# Patient Record
Sex: FEMALE | Race: WHITE | Employment: FULL TIME | URBAN - METROPOLITAN AREA
[De-identification: names, ages, dates, MRNs, and addresses within clinical notes are randomized per-mention and may not be internally consistent; named-entity substitution may affect disease eponyms.]

---

## 2017-02-26 DIAGNOSIS — Z01.419 WELL WOMAN EXAM: Primary | ICD-10-CM

## 2017-05-26 RX ORDER — HYDROCORTISONE ACETATE, ALOE VERA LEAF AND IODOQUINOL 20; 10; 10 MG/G; MG/G; MG/G
1 GEL TOPICAL 3 TIMES DAILY
Qty: 48 G | Refills: 3 | Status: SHIPPED | OUTPATIENT
Start: 2017-05-26 | End: 2018-01-22

## 2017-08-01 ENCOUNTER — LAB ENCOUNTER (OUTPATIENT)
Dept: LAB | Age: 37
End: 2017-08-01
Attending: OBSTETRICS & GYNECOLOGY
Payer: COMMERCIAL

## 2017-08-01 DIAGNOSIS — Z01.419 WELL WOMAN EXAM: Primary | ICD-10-CM

## 2017-08-01 DIAGNOSIS — Z01.419 WELL WOMAN EXAM: ICD-10-CM

## 2017-08-01 LAB
25-HYDROXYVITAMIN D (TOTAL): 27.8 NG/ML (ref 30–100)
ALBUMIN SERPL-MCNC: 4.1 G/DL (ref 3.5–4.8)
ALP LIVER SERPL-CCNC: 32 U/L (ref 37–98)
ALT SERPL-CCNC: 19 U/L (ref 14–54)
AST SERPL-CCNC: 12 U/L (ref 15–41)
BASOPHILS # BLD AUTO: 0.02 X10(3) UL (ref 0–0.1)
BASOPHILS NFR BLD AUTO: 0.4 %
BILIRUB SERPL-MCNC: 0.5 MG/DL (ref 0.1–2)
BUN BLD-MCNC: 15 MG/DL (ref 8–20)
CALCIUM BLD-MCNC: 8.8 MG/DL (ref 8.3–10.3)
CHLORIDE: 107 MMOL/L (ref 101–111)
CHOLEST SMN-MCNC: 170 MG/DL (ref ?–200)
CO2: 27 MMOL/L (ref 22–32)
CREAT BLD-MCNC: 0.76 MG/DL (ref 0.55–1.02)
EOSINOPHIL # BLD AUTO: 0.02 X10(3) UL (ref 0–0.3)
EOSINOPHIL NFR BLD AUTO: 0.4 %
ERYTHROCYTE [DISTWIDTH] IN BLOOD BY AUTOMATED COUNT: 13.1 % (ref 11.5–16)
GLUCOSE BLD-MCNC: 99 MG/DL (ref 70–99)
HCT VFR BLD AUTO: 39.9 % (ref 34–50)
HDLC SERPL-MCNC: 64 MG/DL (ref 45–?)
HDLC SERPL: 2.66 {RATIO} (ref ?–4.44)
HGB BLD-MCNC: 13 G/DL (ref 12–16)
IMMATURE GRANULOCYTE COUNT: 0.02 X10(3) UL (ref 0–1)
IMMATURE GRANULOCYTE RATIO %: 0.4 %
LDLC SERPL CALC-MCNC: 93 MG/DL (ref ?–130)
LDLC SERPL-MCNC: 13 MG/DL (ref 5–40)
LYMPHOCYTES # BLD AUTO: 1.58 X10(3) UL (ref 0.9–4)
LYMPHOCYTES NFR BLD AUTO: 32.1 %
M PROTEIN MFR SERPL ELPH: 7.6 G/DL (ref 6.1–8.3)
MCH RBC QN AUTO: 30.4 PG (ref 27–33.2)
MCHC RBC AUTO-ENTMCNC: 32.6 G/DL (ref 31–37)
MCV RBC AUTO: 93.4 FL (ref 81–100)
MONOCYTES # BLD AUTO: 0.31 X10(3) UL (ref 0.1–0.6)
MONOCYTES NFR BLD AUTO: 6.3 %
NEUTROPHIL ABS PRELIM: 2.97 X10 (3) UL (ref 1.3–6.7)
NEUTROPHILS # BLD AUTO: 2.97 X10(3) UL (ref 1.3–6.7)
NEUTROPHILS NFR BLD AUTO: 60.4 %
NONHDLC SERPL-MCNC: 106 MG/DL (ref ?–130)
PLATELET # BLD AUTO: 278 10(3)UL (ref 150–450)
POTASSIUM SERPL-SCNC: 4.1 MMOL/L (ref 3.6–5.1)
RBC # BLD AUTO: 4.27 X10(6)UL (ref 3.8–5.1)
RED CELL DISTRIBUTION WIDTH-SD: 44.7 FL (ref 35.1–46.3)
SODIUM SERPL-SCNC: 139 MMOL/L (ref 136–144)
TRIGLYCERIDES: 67 MG/DL (ref ?–150)
TSI SER-ACNC: 1.4 MIU/ML (ref 0.35–5.5)
WBC # BLD AUTO: 4.9 X10(3) UL (ref 4–13)

## 2017-08-01 PROCEDURE — 80061 LIPID PANEL: CPT | Performed by: OBSTETRICS & GYNECOLOGY

## 2017-08-01 PROCEDURE — 80050 GENERAL HEALTH PANEL: CPT | Performed by: OBSTETRICS & GYNECOLOGY

## 2017-08-01 PROCEDURE — 82306 VITAMIN D 25 HYDROXY: CPT | Performed by: OBSTETRICS & GYNECOLOGY

## 2017-08-03 RX ORDER — ERGOCALCIFEROL 1.25 MG/1
50000 CAPSULE ORAL
Qty: 6 CAPSULE | Refills: 1 | Status: SHIPPED | OUTPATIENT
Start: 2017-08-03 | End: 2018-01-22

## 2017-10-06 ENCOUNTER — OFFICE VISIT (OUTPATIENT)
Dept: OBGYN CLINIC | Facility: CLINIC | Age: 37
End: 2017-10-06

## 2017-10-06 VITALS
WEIGHT: 130 LBS | HEIGHT: 68 IN | SYSTOLIC BLOOD PRESSURE: 100 MMHG | DIASTOLIC BLOOD PRESSURE: 58 MMHG | BODY MASS INDEX: 19.7 KG/M2

## 2017-10-06 DIAGNOSIS — N89.8 VAGINA ITCHING: ICD-10-CM

## 2017-10-06 DIAGNOSIS — Z01.419 ENCOUNTER FOR WELL WOMAN EXAM WITH ROUTINE GYNECOLOGICAL EXAM: Primary | ICD-10-CM

## 2017-10-06 PROCEDURE — 99395 PREV VISIT EST AGE 18-39: CPT | Performed by: OBSTETRICS & GYNECOLOGY

## 2017-10-06 PROCEDURE — 87480 CANDIDA DNA DIR PROBE: CPT | Performed by: OBSTETRICS & GYNECOLOGY

## 2017-10-06 PROCEDURE — 87510 GARDNER VAG DNA DIR PROBE: CPT | Performed by: OBSTETRICS & GYNECOLOGY

## 2017-10-06 PROCEDURE — 88175 CYTOPATH C/V AUTO FLUID REDO: CPT | Performed by: OBSTETRICS & GYNECOLOGY

## 2017-10-06 PROCEDURE — 87660 TRICHOMONAS VAGIN DIR PROBE: CPT | Performed by: OBSTETRICS & GYNECOLOGY

## 2017-10-06 NOTE — PROGRESS NOTES
Che Dubose is a 42year old female [id number] Patient's last menstrual period was 2017 (exact date).  Patient presents with:  Physical  .no complaints    OBSTETRICS HISTORY:  OB History    Para Term  AB Living   1 1 1     1   SAB TAB E % External Ointment, Apply 1 g topically 2 (two) times daily. , Disp: 15 g, Rfl: 3  •  cephALEXin 500 MG Oral Cap, Take 1 capsule (500 mg total) by mouth 3 (three) times daily. , Disp: 90 capsule, Rfl: 1  •  Iodoquinol-HC-Aloe Polysacch (ALCORTIN A) 1-2-1 % lesions and prolapse  Bladder:  No fullness, masses or tenderness  Vagina:  Normal appearance without lesions, no abnormal discharge  Cervix:  Normal without tenderness on motion  Uterus: normal in size, contour, position, mobility, without tenderness  Adn

## 2017-10-10 ENCOUNTER — MED REC SCAN ONLY (OUTPATIENT)
Dept: OBGYN CLINIC | Facility: CLINIC | Age: 37
End: 2017-10-10

## 2017-12-04 RX ORDER — PRENATAL 56/IRON/FOLIC AC/DHA 35-5-1 MG
1 CAPSULE ORAL DAILY
Qty: 30 CAPSULE | Refills: 6 | Status: SHIPPED | OUTPATIENT
Start: 2017-12-04 | End: 2018-01-03

## 2018-01-22 ENCOUNTER — OFFICE VISIT (OUTPATIENT)
Dept: FAMILY MEDICINE CLINIC | Facility: CLINIC | Age: 38
End: 2018-01-22

## 2018-01-22 VITALS
RESPIRATION RATE: 20 BRPM | HEIGHT: 68 IN | WEIGHT: 133 LBS | DIASTOLIC BLOOD PRESSURE: 68 MMHG | TEMPERATURE: 99 F | OXYGEN SATURATION: 98 % | BODY MASS INDEX: 20.16 KG/M2 | SYSTOLIC BLOOD PRESSURE: 120 MMHG | HEART RATE: 70 BPM

## 2018-01-22 DIAGNOSIS — K64.8 INTERNAL HEMORRHOID: ICD-10-CM

## 2018-01-22 DIAGNOSIS — K59.09 OTHER CONSTIPATION: Primary | ICD-10-CM

## 2018-01-22 PROCEDURE — 99203 OFFICE O/P NEW LOW 30 MIN: CPT | Performed by: FAMILY MEDICINE

## 2018-01-23 NOTE — PROGRESS NOTES
HPI:   Lon Boyce is a 42year old female here with rectal pain and constipation. Pt has been constipated since her 17's. Pt c/o rectal pain   Pt is very busy at work and no time to [employer] on the toilet. \"    Pt stressed with work and her 's /68   Pulse 70   Temp 98.7 °F (37.1 °C) (Oral)   Resp 20   Ht 68\"   Wt 133 lb   LMP 01/15/2018   SpO2 98%   BMI 20.22 kg/m²   Body mass index is 20.22 kg/m².    GENERAL: alert and oriented X 3, well developed, well nourished,in no apparent distress

## 2018-02-01 ENCOUNTER — TELEPHONE (OUTPATIENT)
Dept: FAMILY MEDICINE CLINIC | Facility: CLINIC | Age: 38
End: 2018-02-01

## 2018-02-01 NOTE — TELEPHONE ENCOUNTER
Patient called, she was prescribed Anusol. Not covered under insurance, would like to know if there is another medication that can be prescribed?     Please call

## 2018-04-17 DIAGNOSIS — Z12.31 VISIT FOR SCREENING MAMMOGRAM: Primary | ICD-10-CM

## 2018-04-25 ENCOUNTER — HOSPITAL ENCOUNTER (OUTPATIENT)
Dept: MAMMOGRAPHY | Age: 38
Discharge: HOME | End: 2018-04-25
Attending: ADVANCED PRACTICE MIDWIFE
Payer: COMMERCIAL

## 2018-04-25 DIAGNOSIS — Z12.31 VISIT FOR SCREENING MAMMOGRAM: ICD-10-CM

## 2018-04-25 PROCEDURE — 77067 SCR MAMMO BI INCL CAD: CPT | Performed by: OBSTETRICS & GYNECOLOGY

## 2018-10-12 ENCOUNTER — OFFICE VISIT (OUTPATIENT)
Dept: OBGYN CLINIC | Facility: CLINIC | Age: 38
End: 2018-10-12
Payer: COMMERCIAL

## 2018-10-12 VITALS
RESPIRATION RATE: 16 BRPM | HEIGHT: 68 IN | WEIGHT: 135 LBS | DIASTOLIC BLOOD PRESSURE: 62 MMHG | HEART RATE: 80 BPM | SYSTOLIC BLOOD PRESSURE: 110 MMHG | BODY MASS INDEX: 20.46 KG/M2

## 2018-10-12 DIAGNOSIS — Z12.4 CERVICAL CANCER SCREENING: ICD-10-CM

## 2018-10-12 DIAGNOSIS — Z12.39 BREAST CANCER SCREENING: ICD-10-CM

## 2018-10-12 DIAGNOSIS — Z01.419 ENCOUNTER FOR WELL WOMAN EXAM WITH ROUTINE GYNECOLOGICAL EXAM: Primary | ICD-10-CM

## 2018-10-12 DIAGNOSIS — N89.8 VAGINAL DISCHARGE: ICD-10-CM

## 2018-10-12 PROCEDURE — 87624 HPV HI-RISK TYP POOLED RSLT: CPT | Performed by: OBSTETRICS & GYNECOLOGY

## 2018-10-12 PROCEDURE — 87480 CANDIDA DNA DIR PROBE: CPT | Performed by: OBSTETRICS & GYNECOLOGY

## 2018-10-12 PROCEDURE — 88175 CYTOPATH C/V AUTO FLUID REDO: CPT | Performed by: OBSTETRICS & GYNECOLOGY

## 2018-10-12 PROCEDURE — 87660 TRICHOMONAS VAGIN DIR PROBE: CPT | Performed by: OBSTETRICS & GYNECOLOGY

## 2018-10-12 PROCEDURE — 87510 GARDNER VAG DNA DIR PROBE: CPT | Performed by: OBSTETRICS & GYNECOLOGY

## 2018-10-12 PROCEDURE — 99396 PREV VISIT EST AGE 40-64: CPT | Performed by: OBSTETRICS & GYNECOLOGY

## 2018-10-12 NOTE — PROGRESS NOTES
Nallely Tolentino is a 34year old female [id number] Patient's last menstrual period was 09/17/2018 (exact date). No chief complaint on file.   .  C/o rosacea getting worse, saw dermatologist   C/o increased vaginal discharge    OBSTETRICS HISTORY:  OB History tobacco: Never Used    Substance and Sexual Activity      Alcohol use: No      Drug use: No      Sexual activity: Not on file    Other Topics      Concerns:        Not on file    Social History Narrative      Not on file      FAMILY HISTORY:  No family his masses or tenderness  Vagina:  Normal appearance without lesions, no abnormal discharge  Cervix:  Normal without tenderness on motion  Uterus: normal in size, contour, position, mobility, without tenderness  Adnexa: normal without masses or tenderness  Per

## 2018-10-23 ENCOUNTER — OFFICE VISIT (OUTPATIENT)
Dept: FAMILY MEDICINE CLINIC | Facility: CLINIC | Age: 38
End: 2018-10-23
Payer: COMMERCIAL

## 2018-10-23 VITALS
TEMPERATURE: 98 F | BODY MASS INDEX: 20.16 KG/M2 | OXYGEN SATURATION: 98 % | HEART RATE: 86 BPM | RESPIRATION RATE: 16 BRPM | DIASTOLIC BLOOD PRESSURE: 74 MMHG | SYSTOLIC BLOOD PRESSURE: 110 MMHG | WEIGHT: 133 LBS | HEIGHT: 68 IN

## 2018-10-23 DIAGNOSIS — L71.9 ROSACEA: Primary | ICD-10-CM

## 2018-10-23 DIAGNOSIS — K62.89 RECTAL PAIN: ICD-10-CM

## 2018-10-23 DIAGNOSIS — K59.09 CHRONIC CONSTIPATION: ICD-10-CM

## 2018-10-23 PROCEDURE — 99214 OFFICE O/P EST MOD 30 MIN: CPT | Performed by: FAMILY MEDICINE

## 2018-10-23 RX ORDER — AZELAIC ACID 0.15 G/G
1 GEL TOPICAL 2 TIMES DAILY
Qty: 50 G | Refills: 1 | Status: SHIPPED | OUTPATIENT
Start: 2018-10-23 | End: 2018-10-23

## 2018-10-23 RX ORDER — AZELAIC ACID 0.15 G/G
1 GEL TOPICAL 2 TIMES DAILY
Qty: 50 G | Refills: 1 | Status: SHIPPED | OUTPATIENT
Start: 2018-10-23 | End: 2019-05-08

## 2018-11-06 DIAGNOSIS — Z01.419 ENCOUNTER FOR WELL WOMAN EXAM: Primary | ICD-10-CM

## 2018-11-08 ENCOUNTER — LAB ENCOUNTER (OUTPATIENT)
Dept: LAB | Age: 38
End: 2018-11-08
Attending: OBSTETRICS & GYNECOLOGY
Payer: COMMERCIAL

## 2018-11-08 DIAGNOSIS — Z01.419 ENCOUNTER FOR WELL WOMAN EXAM: ICD-10-CM

## 2018-11-08 PROCEDURE — 80053 COMPREHEN METABOLIC PANEL: CPT

## 2018-11-08 PROCEDURE — 82306 VITAMIN D 25 HYDROXY: CPT

## 2018-11-08 PROCEDURE — 80061 LIPID PANEL: CPT

## 2018-11-08 PROCEDURE — 84443 ASSAY THYROID STIM HORMONE: CPT

## 2018-11-08 PROCEDURE — 85025 COMPLETE CBC W/AUTO DIFF WBC: CPT

## 2018-11-09 RX ORDER — ERGOCALCIFEROL (VITAMIN D2) 1250 MCG
50000 CAPSULE ORAL WEEKLY
Qty: 5 CAPSULE | Refills: 0 | Status: SHIPPED | OUTPATIENT
Start: 2018-11-09 | End: 2018-12-08

## 2018-12-12 ENCOUNTER — TELEPHONE (OUTPATIENT)
Dept: FAMILY MEDICINE CLINIC | Facility: CLINIC | Age: 38
End: 2018-12-12

## 2018-12-12 DIAGNOSIS — K62.89 RECTAL PAIN: ICD-10-CM

## 2018-12-12 NOTE — TELEPHONE ENCOUNTER
Patient is looking for refill of medication for her Hemorrhoids    Proctozone        Ok to LVM patient is at work. Send to [facility] on file.

## 2018-12-18 ENCOUNTER — TELEPHONE (OUTPATIENT)
Dept: FAMILY MEDICINE CLINIC | Facility: CLINIC | Age: 38
End: 2018-12-18

## 2018-12-18 DIAGNOSIS — K62.89 RECTAL PAIN: ICD-10-CM

## 2018-12-18 NOTE — TELEPHONE ENCOUNTER
Pt is calling to say that she is asking for Proctozone. When she went to the pharmacy they wanted to give her Hydrocortisone. Is that the generic of the Proctozone? [facility]  On file. Pls call her back to advise.

## 2018-12-18 NOTE — TELEPHONE ENCOUNTER
Rx Request  hydrocortisone 2.5 % Rectal Cream    Disp:         28.35 g           R: 0      Associated Dx: Rectal pain    Last Visit: 10/23/2018    Last Refilled: 12/12/2018 (External Ointment)    Patient requesting \"Rectal Cream\" instead of external oint

## 2019-01-08 RX ORDER — VALACYCLOVIR HYDROCHLORIDE 1 G/1
1 TABLET, FILM COATED ORAL DAILY
Qty: 30 TABLET | Refills: 3 | Status: SHIPPED | OUTPATIENT
Start: 2019-01-08 | End: 2019-02-07

## 2019-04-17 ENCOUNTER — OFFICE VISIT (OUTPATIENT)
Dept: SURGERY | Facility: CLINIC | Age: 39
End: 2019-04-17

## 2019-04-17 VITALS — HEIGHT: 68 IN | WEIGHT: 130 LBS | BODY MASS INDEX: 19.7 KG/M2

## 2019-04-17 DIAGNOSIS — Z53.21 PROC/TRTMT NOT CRD OUT D/T PT LV BEF SEEN BY HLTH CARE PROV: Primary | ICD-10-CM

## 2019-04-17 NOTE — H&P
New Patient Visit Note       Active Problems      1. Proc/trtmt not crd out d/t pt lv bef seen by [facility] care prov        Chief Complaint   Patient presents with:  Anal / Rectal Problem: NEW PT - HEMORRHOIDS. PT C/O ITCHING AND ON/OFF BLEEDING AND ANURADHA.  PT S 56.7 g, Rfl: 0  •  Tobramycin Sulfate 0.3 % Ophthalmic Solution, Apply 1 or 2 GTT to the right index fingernail twice daily, Disp: 1 Bottle, Rfl: 1  •  hydrocortisone 2.5 % Rectal Cream, Place 1 Application rectally 2 (two) times daily. , Disp: 28.35 g, Rfl

## 2019-05-08 ENCOUNTER — OFFICE VISIT (OUTPATIENT)
Dept: OBGYN CLINIC | Facility: CLINIC | Age: 39
End: 2019-05-08
Payer: COMMERCIAL

## 2019-05-08 VITALS
BODY MASS INDEX: 19.25 KG/M2 | HEART RATE: 62 BPM | WEIGHT: 127 LBS | RESPIRATION RATE: 12 BRPM | HEIGHT: 68 IN | DIASTOLIC BLOOD PRESSURE: 72 MMHG | SYSTOLIC BLOOD PRESSURE: 106 MMHG

## 2019-05-08 DIAGNOSIS — N89.8 VAGINAL DISCHARGE: ICD-10-CM

## 2019-05-08 DIAGNOSIS — L29.2 VULVAR ITCHING: Primary | ICD-10-CM

## 2019-05-08 PROCEDURE — 87480 CANDIDA DNA DIR PROBE: CPT | Performed by: NURSE PRACTITIONER

## 2019-05-08 PROCEDURE — 87510 GARDNER VAG DNA DIR PROBE: CPT | Performed by: NURSE PRACTITIONER

## 2019-05-08 PROCEDURE — 87660 TRICHOMONAS VAGIN DIR PROBE: CPT | Performed by: NURSE PRACTITIONER

## 2019-05-08 PROCEDURE — 99213 OFFICE O/P EST LOW 20 MIN: CPT | Performed by: NURSE PRACTITIONER

## 2019-05-08 NOTE — PROGRESS NOTES
HPI:   Raj Daniels is a 34year old female who presents evaluation and management of vulvar itching and discharge on and off for the past few months. Menses: Patient's last menstrual period was 04/30/2019 (exact date).    Onset: see HPI  Pain: no  Evy Moon acne, no hirsutism, no ulcers  BREASTS: Normal inspection, no dominant masses on palpation, no lymphadenopathy  ABD: Soft, nontender and not distended, no masses, no hernia  EXTREMITIES: No edema  EXTERNAL GENITALIA: Normal appearance for age, no lesions,

## 2019-07-02 RX ORDER — FLUCONAZOLE 150 MG/1
150 TABLET ORAL SEE ADMIN INSTRUCTIONS
Qty: 2 TABLET | Refills: 0 | Status: SHIPPED | OUTPATIENT
Start: 2019-07-02 | End: 2019-09-06 | Stop reason: ALTCHOICE

## 2019-09-06 ENCOUNTER — HOSPITAL ENCOUNTER (OUTPATIENT)
Age: 39
Discharge: HOME | End: 2019-09-06
Attending: EMERGENCY MEDICINE
Payer: COMMERCIAL

## 2019-09-06 VITALS
HEART RATE: 73 BPM | RESPIRATION RATE: 16 BRPM | SYSTOLIC BLOOD PRESSURE: 120 MMHG | DIASTOLIC BLOOD PRESSURE: 86 MMHG | TEMPERATURE: 99 F | OXYGEN SATURATION: 100 %

## 2019-09-06 DIAGNOSIS — K29.00 ACUTE GASTRITIS WITHOUT HEMORRHAGE, UNSPECIFIED GASTRITIS TYPE: Primary | ICD-10-CM

## 2019-09-06 LAB
#MXD IC: 0.7 X10ˆ3/UL (ref 0.1–1)
ALBUMIN SERPL-MCNC: 3.5 G/DL (ref 3.4–5)
ALBUMIN/GLOB SERPL: 1.1 {RATIO} (ref 1–2)
ALP LIVER SERPL-CCNC: 32 U/L (ref 37–98)
ALT SERPL-CCNC: 23 U/L (ref 13–56)
ANION GAP SERPL CALC-SCNC: 6 MMOL/L (ref 0–18)
AST SERPL-CCNC: 18 U/L (ref 15–37)
BILIRUB SERPL-MCNC: 0.3 MG/DL (ref 0.1–2)
BUN BLD-MCNC: 8 MG/DL (ref 7–18)
BUN/CREAT SERPL: 12.9 (ref 10–20)
CALCIUM BLD-MCNC: 8.6 MG/DL (ref 8.5–10.1)
CHLORIDE SERPL-SCNC: 107 MMOL/L (ref 98–112)
CO2 SERPL-SCNC: 26 MMOL/L (ref 21–32)
CREAT BLD-MCNC: 0.62 MG/DL (ref 0.55–1.02)
GLOBULIN PLAS-MCNC: 3.3 G/DL (ref 2.8–4.4)
GLUCOSE BLD-MCNC: 90 MG/DL (ref 70–99)
HCT VFR BLD AUTO: 38.3 % (ref 35–48)
HGB BLD-MCNC: 12.7 G/DL (ref 12–16)
LIPASE SERPL-CCNC: 206 U/L (ref 73–393)
LYMPHOCYTES # BLD AUTO: 2.5 X10ˆ3/UL (ref 1–4)
LYMPHOCYTES NFR BLD AUTO: 44.3 %
M PROTEIN MFR SERPL ELPH: 6.8 G/DL (ref 6.4–8.2)
MCH RBC QN AUTO: 29.8 PG (ref 26–34)
MCHC RBC AUTO-ENTMCNC: 33.2 G/DL (ref 31–37)
MCV RBC AUTO: 89.9 FL (ref 80–100)
MIXED CELL %: 12.7 %
NEUTROPHILS # BLD AUTO: 2.5 X10ˆ3/UL (ref 1.5–7.7)
NEUTROPHILS NFR BLD AUTO: 43 %
OSMOLALITY SERPL CALC.SUM OF ELEC: 286 MOSM/KG (ref 275–295)
PLATELET # BLD AUTO: 282 X10ˆ3/UL (ref 150–450)
POCT BILIRUBIN URINE: NEGATIVE
POCT GLUCOSE URINE: NEGATIVE MG/DL
POCT KETONE URINE: NEGATIVE MG/DL
POCT NITRITE URINE: NEGATIVE
POCT PH URINE: 5.5 (ref 5–8)
POCT PROTEIN URINE: NEGATIVE MG/DL
POCT SPECIFIC GRAVITY URINE: 1.01
POCT URINE CLARITY: CLEAR
POCT URINE COLOR: YELLOW
POCT URINE PREGNANCY: NEGATIVE
POCT UROBILINOGEN URINE: 0.2 MG/DL
POTASSIUM SERPL-SCNC: 3.6 MMOL/L (ref 3.5–5.1)
RBC # BLD AUTO: 4.26 X10ˆ6/UL (ref 3.8–5.3)
SODIUM SERPL-SCNC: 139 MMOL/L (ref 136–145)
WBC # BLD AUTO: 5.7 X10ˆ3/UL (ref 4–11)

## 2019-09-06 PROCEDURE — 99204 OFFICE O/P NEW MOD 45 MIN: CPT

## 2019-09-06 PROCEDURE — 36415 COLL VENOUS BLD VENIPUNCTURE: CPT

## 2019-09-06 PROCEDURE — 80053 COMPREHEN METABOLIC PANEL: CPT | Performed by: EMERGENCY MEDICINE

## 2019-09-06 PROCEDURE — 81002 URINALYSIS NONAUTO W/O SCOPE: CPT

## 2019-09-06 PROCEDURE — 85025 COMPLETE CBC W/AUTO DIFF WBC: CPT | Performed by: EMERGENCY MEDICINE

## 2019-09-06 PROCEDURE — 81025 URINE PREGNANCY TEST: CPT | Performed by: EMERGENCY MEDICINE

## 2019-09-06 PROCEDURE — 99213 OFFICE O/P EST LOW 20 MIN: CPT

## 2019-09-06 PROCEDURE — 83690 ASSAY OF LIPASE: CPT | Performed by: EMERGENCY MEDICINE

## 2019-09-06 PROCEDURE — 81002 URINALYSIS NONAUTO W/O SCOPE: CPT | Performed by: EMERGENCY MEDICINE

## 2019-09-06 PROCEDURE — 81025 URINE PREGNANCY TEST: CPT

## 2019-09-06 RX ORDER — FAMOTIDINE 40 MG/1
40 TABLET, FILM COATED ORAL 2 TIMES DAILY PRN
Qty: 30 TABLET | Refills: 0 | Status: SHIPPED | OUTPATIENT
Start: 2019-09-06 | End: 2019-10-06

## 2019-09-06 RX ORDER — FAMOTIDINE 40 MG/1
40 TABLET, FILM COATED ORAL 2 TIMES DAILY PRN
Qty: 30 TABLET | Refills: 0 | Status: SHIPPED | OUTPATIENT
Start: 2019-09-06 | End: 2019-09-21

## 2019-09-06 RX ORDER — LIDOCAINE HYDROCHLORIDE 20 MG/ML
10 SOLUTION OROPHARYNGEAL ONCE
Status: COMPLETED | OUTPATIENT
Start: 2019-09-06 | End: 2019-09-06

## 2019-09-06 RX ORDER — MAGNESIUM HYDROXIDE/ALUMINUM HYDROXICE/SIMETHICONE 120; 1200; 1200 MG/30ML; MG/30ML; MG/30ML
30 SUSPENSION ORAL ONCE
Status: COMPLETED | OUTPATIENT
Start: 2019-09-06 | End: 2019-09-06

## 2019-09-06 NOTE — ED INITIAL ASSESSMENT (HPI)
Pt c/o generalized abdominal pain x 1 week intermittent with constipation, last BM today small and hard. Nausea, chills and sweating in the beginning of the week on the 1st day of feeling these s/s, but now resolved, denies vomiting. Denies other s/s.

## 2019-09-07 NOTE — ED PROVIDER NOTES
Patient Seen in: [facility]    History   Patient presents with:  Abdominal Pain    Stated Complaint: abdominal pain x 7 days    HPI    28-year-old patient presents to the emergency department with her .   She was in R reviewed and negative except as noted above.     Physical Exam     ED Triage Vitals [09/06/19 1643]   /88   Pulse 81   Resp 18   Temp 99 °F (37.2 °C)   Temp src Oral   SpO2 100 %   O2 Device None (Room air)       Current:/86   Pulse 73   Temp 99 36012  [phone]              Medications Prescribed:  Discharge Medication List as of 9/6/2019  6:29 PM    START taking these medications    !! famotidine (PEPCID) 40 MG Oral Tab  Take 1 tablet (40 mg total) by mouth 2 (two) times daily as needed for H

## 2019-10-17 ENCOUNTER — OFFICE VISIT (OUTPATIENT)
Dept: OBGYN CLINIC | Facility: CLINIC | Age: 39
End: 2019-10-17
Payer: COMMERCIAL

## 2019-10-17 VITALS
DIASTOLIC BLOOD PRESSURE: 60 MMHG | HEART RATE: 102 BPM | BODY MASS INDEX: 19.25 KG/M2 | HEIGHT: 68 IN | SYSTOLIC BLOOD PRESSURE: 114 MMHG | WEIGHT: 127 LBS

## 2019-10-17 DIAGNOSIS — N76.0 VAGINITIS AND VULVOVAGINITIS: ICD-10-CM

## 2019-10-17 DIAGNOSIS — Z01.419 WELL WOMAN EXAM WITH ROUTINE GYNECOLOGICAL EXAM: Primary | ICD-10-CM

## 2019-10-17 DIAGNOSIS — Z12.31 ENCOUNTER FOR SCREENING MAMMOGRAM FOR MALIGNANT NEOPLASM OF BREAST: ICD-10-CM

## 2019-10-17 DIAGNOSIS — Z23 NEED FOR VACCINATION: ICD-10-CM

## 2019-10-17 PROCEDURE — 87510 GARDNER VAG DNA DIR PROBE: CPT | Performed by: OBSTETRICS & GYNECOLOGY

## 2019-10-17 PROCEDURE — 87660 TRICHOMONAS VAGIN DIR PROBE: CPT | Performed by: OBSTETRICS & GYNECOLOGY

## 2019-10-17 PROCEDURE — 90471 IMMUNIZATION ADMIN: CPT | Performed by: OBSTETRICS & GYNECOLOGY

## 2019-10-17 PROCEDURE — 87480 CANDIDA DNA DIR PROBE: CPT | Performed by: OBSTETRICS & GYNECOLOGY

## 2019-10-17 PROCEDURE — 99396 PREV VISIT EST AGE 40-64: CPT | Performed by: OBSTETRICS & GYNECOLOGY

## 2019-10-17 PROCEDURE — 90686 IIV4 VACC NO PRSV 0.5 ML IM: CPT | Performed by: OBSTETRICS & GYNECOLOGY

## 2019-10-17 NOTE — PROGRESS NOTES
Lul Masters is a 37year old female [id number] Patient's last menstrual period was 10/11/2019 (exact date). Patient presents with:  Wellness Visit  . Periods are regular midcycle she has a lot of discharge no odor or burning. We will do affirm.     OB tobacco: Never Used    Substance and Sexual Activity      Alcohol use: No      Drug use: Never      Sexual activity: Not on file    Lifestyle      Physical activity:        Days per week: Not on file        Minutes per session: Not on file      Stress: Not Application rectally 2 (two) times daily. , Disp: 28.35 g, Rfl: 0  •  Ivermectin (SOOLANTRA) 1 % External Cream, Apply to face once daily, Disp: 45 g, Rfl: 2    ALLERGIES:  No Known Allergies      Review of Systems:  Constitutional:  Denies fatigue, night s PRESERVATIVE FREE, 0.5 ML    Encounter for screening mammogram for malignant neoplasm of breast  -     [facility] CHELSY 2D+3D SCREENING BILAT (CPT=77067/68101);  Future

## 2019-10-18 ENCOUNTER — LAB ENCOUNTER (OUTPATIENT)
Dept: LAB | Age: 39
End: 2019-10-18
Attending: OBSTETRICS & GYNECOLOGY
Payer: COMMERCIAL

## 2019-10-18 DIAGNOSIS — Z01.419 WELL WOMAN EXAM WITH ROUTINE GYNECOLOGICAL EXAM: ICD-10-CM

## 2019-10-18 PROCEDURE — 80053 COMPREHEN METABOLIC PANEL: CPT

## 2019-10-18 PROCEDURE — 85025 COMPLETE CBC W/AUTO DIFF WBC: CPT

## 2019-10-18 PROCEDURE — 80061 LIPID PANEL: CPT

## 2019-10-18 PROCEDURE — 84443 ASSAY THYROID STIM HORMONE: CPT

## 2019-11-06 ENCOUNTER — OFFICE VISIT (OUTPATIENT)
Dept: OBGYN CLINIC | Facility: CLINIC | Age: 39
End: 2019-11-06
Payer: COMMERCIAL

## 2019-11-06 VITALS
SYSTOLIC BLOOD PRESSURE: 108 MMHG | RESPIRATION RATE: 14 BRPM | TEMPERATURE: 98 F | HEIGHT: 68 IN | WEIGHT: 130 LBS | HEART RATE: 72 BPM | BODY MASS INDEX: 19.7 KG/M2 | DIASTOLIC BLOOD PRESSURE: 74 MMHG

## 2019-11-06 DIAGNOSIS — Z12.4 SCREENING FOR MALIGNANT NEOPLASM OF CERVIX: ICD-10-CM

## 2019-11-06 DIAGNOSIS — Z11.51 SCREENING FOR HUMAN PAPILLOMAVIRUS (HPV): ICD-10-CM

## 2019-11-06 DIAGNOSIS — N89.8 VAGINAL DISCHARGE: Primary | ICD-10-CM

## 2019-11-06 PROCEDURE — 87624 HPV HI-RISK TYP POOLED RSLT: CPT | Performed by: NURSE PRACTITIONER

## 2019-11-06 PROCEDURE — 88175 CYTOPATH C/V AUTO FLUID REDO: CPT | Performed by: NURSE PRACTITIONER

## 2019-11-06 NOTE — PROGRESS NOTES
[facility] is a 37year old female. HPI:   Patient presents with: Other: pap and hpv    Pt recently had her annual exam done with Dr. Sandra Leal. A Pap smear was not done d/t normal screening in 2018 and current guidelines.  Pt reports she is \"unhappy mucosa, no lesions, light brown discharge consistent with start of menses. No odor or excess discharge. No presence of Candida.                      Cervix: normal os, no lesions or bleeding                    R/V: normal perineum,   EXTREMITIES: nontender

## 2019-12-03 ENCOUNTER — HOSPITAL ENCOUNTER (OUTPATIENT)
Dept: MAMMOGRAPHY | Facility: HOSPITAL | Age: 39
Discharge: HOME | End: 2019-12-03
Attending: OBSTETRICS & GYNECOLOGY
Payer: COMMERCIAL

## 2019-12-03 DIAGNOSIS — Z12.31 ENCOUNTER FOR SCREENING MAMMOGRAM FOR MALIGNANT NEOPLASM OF BREAST: ICD-10-CM

## 2019-12-03 PROCEDURE — 77067 SCR MAMMO BI INCL CAD: CPT | Performed by: OBSTETRICS & GYNECOLOGY

## 2019-12-03 PROCEDURE — 77063 BREAST TOMOSYNTHESIS BI: CPT | Performed by: OBSTETRICS & GYNECOLOGY

## 2020-04-16 NOTE — PROGRESS NOTES
Virtual Telephone Check-In    Mimi Macario verbally consents to a Virtual/Telephone Check-In visit on 04/16/20. Patient understands and accepts financial responsibility for any deductible, co-insurance and/or co-pays associated with this service. Use      Smoking status: Never Smoker      Smokeless tobacco: Never Used    Alcohol use: No    Drug use: Never       REVIEW OF SYSTEMS:   GENERAL HEALTH: feels well otherwise  SKIN: denies any unusual skin lesions or rashes  RESPIRATORY: denies shortness o sorts of things that are out of your control: what might happen in the future; how the virus might affect you or your loved ones or your community or your country or the world – and what will happen then - and so on.  And while it's completely natural for u if we're swept away by that storm inside us, there's nothing effective we can do.  So the first practical step is to 'drop anchor', using the simple ACE formula:     A = Acknowledge your thoughts and feelings   C = Concentrate awareness on your body   E = E connect with your body, and actively move it. Why? So you can gain as much control as possible over your physical actions, even though you can't control your feelings.  (Remember, F = Focus on what's in your control)     And as you acknowledge your thoughts those you live with, and those you can realistically help. Since you're spending a lot more time at home, through self-isolation or forced quarantine, or social distancing, what are the most effective ways to spend that time?    Repeatedly throughout the can do for yourself? What are kind ways you can treat others who are suffering? What are kind, caring ways of contributing to the wellbeing of your community?    What can you say and do that will enable you to look back in years to come and feel proud o

## 2020-05-04 ENCOUNTER — OFFICE VISIT (OUTPATIENT)
Dept: SURGERY | Facility: CLINIC | Age: 40
End: 2020-05-04
Payer: COMMERCIAL

## 2020-05-04 VITALS — TEMPERATURE: 99 F

## 2020-05-04 DIAGNOSIS — K64.8 INTERNAL HEMORRHOIDS: Primary | ICD-10-CM

## 2020-05-04 DIAGNOSIS — K92.2 INTESTINAL BLEEDING: ICD-10-CM

## 2020-05-04 PROCEDURE — 46600 DIAGNOSTIC ANOSCOPY SPX: CPT | Performed by: SURGERY

## 2020-05-04 PROCEDURE — 99243 OFF/OP CNSLTJ NEW/EST LOW 30: CPT | Performed by: SURGERY

## 2020-05-04 RX ORDER — POLYETHYLENE GLYCOL 3350, SODIUM CHLORIDE, SODIUM BICARBONATE, POTASSIUM CHLORIDE 420; 11.2; 5.72; 1.48 G/4L; G/4L; G/4L; G/4L
POWDER, FOR SOLUTION ORAL
Qty: 1 BOTTLE | Refills: 0 | Status: SHIPPED | OUTPATIENT
Start: 2020-05-04 | End: 2021-06-01 | Stop reason: ALTCHOICE

## 2020-05-04 RX ORDER — POLYETHYLENE GLYCOL 3350 17 G/17G
17 POWDER, FOR SOLUTION ORAL DAILY
COMMUNITY
End: 2020-05-04

## 2020-05-04 RX ORDER — HYDROCORTISONE 25 MG/G
CREAM TOPICAL
Qty: 1 TUBE | Refills: 0 | Status: SHIPPED | OUTPATIENT
Start: 2020-05-04 | End: 2020-10-22

## 2020-05-04 NOTE — PROGRESS NOTES
Follow Up Visit Note       Active Problems      1. Internal hemorrhoids    2. Intestinal bleeding          Chief Complaint   Patient presents with:  Anal / Rectal Problem: having rectal bleeding with bowel movements.        History of Present Illness  The p ASCUS with positive high risk HPV cervical 03/06/2012   • Human papilloma virus infection 05/13,03/12   • Infertility, female    • Low grade squamous intraepithelial lesion (LGSIL) on cervical Pap smear 05/13,12/12 05/2013 hpv positive   • Pap smear for Disp: 1 Bottle, Rfl: 0  •  Hydrocortisone (PROCTOSOL HC) 2.5 % External Cream, Apply to affected area twice daily. , Disp: 1 Tube, Rfl: 0  •  ALPRAZolam (XANAX) 0.25 MG Oral Tab, Take 1 tablet (0.25 mg total) by mouth 2 (two) times daily as needed. , Disp: 2 tachypnea. No respiratory distress. She has no decreased breath sounds. She has no wheezes. She has no rhonchi. She has no rales. Genitourinary: Rectum:      Internal hemorrhoid (at 8:00 and at 4:00) present.       No rectal mass, anal fissure, tenderness given a high-fiber diet instruction sheet, as well as a description of the rubber band procedure. I will next see her at the time of her colonoscopy.     Nelly Vences MD    Addendum:  I, Dr. Zulema Garcia, personally performed the services described in

## 2020-07-07 RX ORDER — VALACYCLOVIR HYDROCHLORIDE 1 G/1
1 TABLET, FILM COATED ORAL DAILY
Qty: 30 TABLET | Refills: 2 | Status: SHIPPED | OUTPATIENT
Start: 2020-07-07 | End: 2020-10-05

## 2020-09-12 ENCOUNTER — HOSPITAL ENCOUNTER (OUTPATIENT)
Age: 40
Discharge: HOME | End: 2020-09-12
Payer: COMMERCIAL

## 2020-09-12 VITALS
TEMPERATURE: 99 F | HEART RATE: 104 BPM | SYSTOLIC BLOOD PRESSURE: 135 MMHG | OXYGEN SATURATION: 100 % | RESPIRATION RATE: 20 BRPM | DIASTOLIC BLOOD PRESSURE: 91 MMHG

## 2020-09-12 DIAGNOSIS — Z20.822 EXPOSURE TO COVID-19 VIRUS: Primary | ICD-10-CM

## 2020-09-12 PROCEDURE — 99213 OFFICE O/P EST LOW 20 MIN: CPT | Performed by: PHYSICIAN ASSISTANT

## 2020-09-12 NOTE — ED PROVIDER NOTES
Patient Seen in: [facility]      History   Patient presents with:  Testing    Stated Complaint: Covid test due to exp     HPI     CHIEF COMPLAINT: COVID testing request    HISTORY OF PRESENT ILLNESS: Patient is a pleasant 4 Family history reviewed with patient/caregiver and is not pertinent to presenting problem.     Social History    Tobacco Use      Smoking status: Never Smoker      Smokeless tobacco: Never Used    Alcohol use: No    Drug use: Never instructions were provided to help prevent relapse or worsening. I discussed the case with the patient and they had no questions, complaints, or concerns.   Patient states they understand diagnosis, followup plan and agree with and understand  discharge i

## 2020-09-12 NOTE — ED INITIAL ASSESSMENT (HPI)
Pt is here for covid testing. Pt states that her  is positive. Pt does not have any symptoms. Detail Level: Detailed Quality 110: Preventive Care And Screening: Influenza Immunization: Influenza Immunization not Administered for Documented Reasons. Quality 130: Documentation Of Current Medications In The Medical Record: Current Medications Documented

## 2020-09-18 LAB — SARS-COV-2 BY PCR: NOT DETECTED

## 2020-10-22 ENCOUNTER — OFFICE VISIT (OUTPATIENT)
Dept: OBGYN CLINIC | Facility: CLINIC | Age: 40
End: 2020-10-22
Payer: COMMERCIAL

## 2020-10-22 VITALS
HEIGHT: 67.5 IN | SYSTOLIC BLOOD PRESSURE: 118 MMHG | HEART RATE: 82 BPM | WEIGHT: 129 LBS | DIASTOLIC BLOOD PRESSURE: 68 MMHG | BODY MASS INDEX: 20.01 KG/M2 | RESPIRATION RATE: 14 BRPM

## 2020-10-22 DIAGNOSIS — Z12.4 SCREENING FOR CERVICAL CANCER: ICD-10-CM

## 2020-10-22 DIAGNOSIS — Z13.29 SCREENING FOR ENDOCRINE, NUTRITIONAL, METABOLIC AND IMMUNITY DISORDER: ICD-10-CM

## 2020-10-22 DIAGNOSIS — Z01.419 WELL WOMAN EXAM WITH ROUTINE GYNECOLOGICAL EXAM: Primary | ICD-10-CM

## 2020-10-22 DIAGNOSIS — Z12.31 ENCOUNTER FOR SCREENING MAMMOGRAM FOR MALIGNANT NEOPLASM OF BREAST: ICD-10-CM

## 2020-10-22 DIAGNOSIS — Z13.228 SCREENING FOR ENDOCRINE, NUTRITIONAL, METABOLIC AND IMMUNITY DISORDER: ICD-10-CM

## 2020-10-22 DIAGNOSIS — Z13.0 SCREENING FOR ENDOCRINE, NUTRITIONAL, METABOLIC AND IMMUNITY DISORDER: ICD-10-CM

## 2020-10-22 DIAGNOSIS — Z13.21 SCREENING FOR ENDOCRINE, NUTRITIONAL, METABOLIC AND IMMUNITY DISORDER: ICD-10-CM

## 2020-10-22 PROCEDURE — 87624 HPV HI-RISK TYP POOLED RSLT: CPT | Performed by: OBSTETRICS & GYNECOLOGY

## 2020-10-22 PROCEDURE — 99396 PREV VISIT EST AGE 40-64: CPT | Performed by: OBSTETRICS & GYNECOLOGY

## 2020-10-22 PROCEDURE — 88175 CYTOPATH C/V AUTO FLUID REDO: CPT | Performed by: OBSTETRICS & GYNECOLOGY

## 2020-10-22 PROCEDURE — 3078F DIAST BP <80 MM HG: CPT | Performed by: OBSTETRICS & GYNECOLOGY

## 2020-10-22 PROCEDURE — 3008F BODY MASS INDEX DOCD: CPT | Performed by: OBSTETRICS & GYNECOLOGY

## 2020-10-22 PROCEDURE — 3074F SYST BP LT 130 MM HG: CPT | Performed by: OBSTETRICS & GYNECOLOGY

## 2020-10-22 RX ORDER — MULTIVIT-MIN/IRON FUM/FOLIC AC 7.5 MG-4
1 TABLET ORAL DAILY
COMMUNITY

## 2020-10-22 NOTE — H&P
[facility]  Obstetrics and Gynecology   History & Physical  Established     Chief complaint: Patient presents with:  Physical    Subjective:     HPI: Robert Powell is a 41year old  with LMP Patient's last menstrual period was 10/10/2020 Disp: , Rfl:     •  Ivermectin (SOOLANTRA) 1 % External Cream, Apply to face once daily, Disp: 45 g, Rfl: 2    •  PEG 3350-KCl-Na Bicarb-NaCl (TRILYTE) 420 g Oral Recon Soln, Starting at 4:00 pm the night before procedure, drink 8 ounces of the prep every on file        Inability: Not on file      Transportation needs        Medical: Not on file        Non-medical: Not on file    Tobacco Use      Smoking status: Never Smoker      Smokeless tobacco: Never Used    Substance and Sexual Activity      Alcohol us Colon Cancer Neg        Objective:      10/22/20  1210   BP: 118/68   Pulse: 82   Resp: 14   Weight: 129 lb (58.5 kg)   Height: 67.5\"       Body mass index is 19.91 kg/m². Physical Exam:  Physical Exam   Nursing note and vitals reviewed.    Constitution Value Date    GLU 92 10/18/2019    BUN 13 10/18/2019    BUNCREA 15.7 10/18/2019    CREATSERUM 0.83 10/18/2019    ANIONGAP 6 10/18/2019     08/01/2017    GFRNAA 88 10/18/2019    GFRAA 101 10/18/2019    CA 8.6 10/18/2019    OSMOCALC 288 10/18/2019 TO FREE T4; Future         Plan:     Pap & HPV done per patient request.   Breast exam - very dense, cystic but benign     Dense breasts  -Anxious about MBI. Can stick with just mammograms for now. Doesn't really want whole breast US at thist time.     -Yani

## 2020-11-10 ENCOUNTER — LAB ENCOUNTER (OUTPATIENT)
Dept: LAB | Age: 40
End: 2020-11-10
Attending: OBSTETRICS & GYNECOLOGY
Payer: COMMERCIAL

## 2020-11-10 DIAGNOSIS — Z13.0 SCREENING FOR ENDOCRINE, NUTRITIONAL, METABOLIC AND IMMUNITY DISORDER: ICD-10-CM

## 2020-11-10 DIAGNOSIS — Z13.228 SCREENING FOR ENDOCRINE, NUTRITIONAL, METABOLIC AND IMMUNITY DISORDER: ICD-10-CM

## 2020-11-10 DIAGNOSIS — Z13.21 SCREENING FOR ENDOCRINE, NUTRITIONAL, METABOLIC AND IMMUNITY DISORDER: ICD-10-CM

## 2020-11-10 DIAGNOSIS — Z01.419 WELL WOMAN EXAM WITH ROUTINE GYNECOLOGICAL EXAM: ICD-10-CM

## 2020-11-10 DIAGNOSIS — Z13.29 SCREENING FOR ENDOCRINE, NUTRITIONAL, METABOLIC AND IMMUNITY DISORDER: ICD-10-CM

## 2020-11-10 PROCEDURE — 36415 COLL VENOUS BLD VENIPUNCTURE: CPT

## 2020-11-10 PROCEDURE — 83001 ASSAY OF GONADOTROPIN (FSH): CPT

## 2020-11-10 PROCEDURE — 83036 HEMOGLOBIN GLYCOSYLATED A1C: CPT

## 2020-11-10 PROCEDURE — 80061 LIPID PANEL: CPT

## 2020-11-10 PROCEDURE — 80053 COMPREHEN METABOLIC PANEL: CPT

## 2020-11-10 PROCEDURE — 82670 ASSAY OF TOTAL ESTRADIOL: CPT

## 2020-11-10 PROCEDURE — 84443 ASSAY THYROID STIM HORMONE: CPT

## 2020-11-10 PROCEDURE — 85025 COMPLETE CBC W/AUTO DIFF WBC: CPT

## 2020-11-15 PROBLEM — R73.01 ELEVATED FASTING GLUCOSE: Status: ACTIVE | Noted: 2020-11-10

## 2020-11-15 PROBLEM — N89.8 VAGINAL DISCHARGE: Status: RESOLVED | Noted: 2019-11-06 | Resolved: 2020-11-15

## 2020-11-15 PROBLEM — L29.2 VULVAR ITCHING: Status: RESOLVED | Noted: 2019-05-08 | Resolved: 2020-11-15

## 2021-01-05 ENCOUNTER — IMMUNIZATION (OUTPATIENT)
Dept: LAB | Facility: HOSPITAL | Age: 41
End: 2021-01-05
Attending: PREVENTIVE MEDICINE
Payer: COMMERCIAL

## 2021-01-05 DIAGNOSIS — Z23 NEED FOR VACCINATION: ICD-10-CM

## 2021-01-05 PROCEDURE — 0011A SARSCOV2 VAC 100MCG/0.5ML IM: CPT

## 2021-01-07 ENCOUNTER — HOSPITAL ENCOUNTER (OUTPATIENT)
Dept: MAMMOGRAPHY | Facility: HOSPITAL | Age: 41
Discharge: HOME | End: 2021-01-07
Attending: OBSTETRICS & GYNECOLOGY
Payer: COMMERCIAL

## 2021-01-07 DIAGNOSIS — Z12.31 ENCOUNTER FOR SCREENING MAMMOGRAM FOR MALIGNANT NEOPLASM OF BREAST: ICD-10-CM

## 2021-01-07 PROCEDURE — 77067 SCR MAMMO BI INCL CAD: CPT | Performed by: OBSTETRICS & GYNECOLOGY

## 2021-01-07 PROCEDURE — 77063 BREAST TOMOSYNTHESIS BI: CPT | Performed by: OBSTETRICS & GYNECOLOGY

## 2021-01-12 ENCOUNTER — TELEPHONE (OUTPATIENT)
Dept: INTERNAL MEDICINE CLINIC | Facility: HOSPITAL | Age: 41
End: 2021-01-12

## 2021-01-12 NOTE — TELEPHONE ENCOUNTER
COVID vaccine tracker     [] [facility]   [] [facility]   []  [address] [facility]    Department: [facility]  Supervisor: Susan Sofia OR Yecenia  Date of Vaccine:   Date of Second dose: 1-5-21  Second dose DUE?  no    Did employee have to call off work? no  How many d Tylenol/ibuprofen   [x] Call PCP and [facility]   [] Seek emergent care and f/u with [facility] when able     Additional Comments:    hot compress to lymph node wear loose clothing, linda rash and monitor- call PCP if SE worsen

## 2021-01-27 NOTE — PROGRESS NOTES
[facility]    REASON FOR VISIT:    Current Complaints: Patient presents with:  Establish Care: constipation (started in teenage years), excessive belching (few months),       HPI:  Delfin Mckeon is a 41year old female who presents with c/o chr night before procedure, drink 8 ounces of the prep every 15-20 minutes until finished (Patient not taking: Reported on 1/27/2021 ) 1 Bottle 0          PAST MEDICAL, SOCIAL  AND FAMILY HISTORY:  Tobacco:     Smoking status: Never Smoker   Smokeless tobacco: (59 kg)  10/17/19 : 127 lb (57.6 kg)  05/08/19 : 127 lb (57.6 kg)  04/17/19 : 130 lb (59 kg)    Body mass index is 19.43 kg/m². Constitutional: Appears stated age, nourished, and pleasant.  Vital signs reviewed as noted   Head: Normocephalic and atrauma get good relief with MiraLAX. Discussed lifestyle and diet modification. Increase fiber intake, can try Benefiber OTC. Okay to take MiraLAX every other day or every third day as needed. Keep an eye on good hydration daily. Regular exercise encouraged.  We'

## 2021-01-27 NOTE — PATIENT INSTRUCTIONS
Anxiety Reaction  Anxiety is the feeling we all get when we think something bad might happen. It is a normal response to stress and normally causes only a mild reaction. When anxiety becomes more severe, it can interfere with daily life.  In some cases, y · Unfortunately, many stressful situations can't be avoided. It is necessary to learn how to better manage stress. There are many proven methods that will reduce your anxiety.  These include simple things such as exercise, good nutrition, and adequate rest. Fiber is what gives strength and structure to plants. Most grains, beans, vegetables, and fruits contain fiber. Foods rich in fiber are often low in calories and fat, and they fill you up more.  They may also reduce your risks for certain health problems. T Keep track of how much fiber you eat. Start by reading food labels. Then eat a variety of foods high in fiber.  As you start to eat more fiber, ask your healthcare provider how much water you should be drinking to keep your digestive system working smoothly

## 2021-01-28 ENCOUNTER — TELEPHONE (OUTPATIENT)
Dept: INTERNAL MEDICINE CLINIC | Facility: CLINIC | Age: 41
End: 2021-01-28

## 2021-01-28 NOTE — TELEPHONE ENCOUNTER
Pt saw Dr Nathaniel Bruno on 1/27 for excessive burping and Pt called GI and they got her in on 3/15/21. Pt wondering if there is anything that she can take to help with the burping until she can get in to GI.  Pt very nicely said that she does not want to deal wi

## 2021-02-02 ENCOUNTER — IMMUNIZATION (OUTPATIENT)
Dept: LAB | Facility: HOSPITAL | Age: 41
End: 2021-02-02
Attending: PREVENTIVE MEDICINE
Payer: COMMERCIAL

## 2021-02-02 DIAGNOSIS — Z23 NEED FOR VACCINATION: Primary | ICD-10-CM

## 2021-02-02 PROCEDURE — 0012A SARSCOV2 VAC 100MCG/0.5ML IM: CPT

## 2021-02-05 ENCOUNTER — HOSPITAL ENCOUNTER (OUTPATIENT)
Age: 41
Discharge: HOME | End: 2021-02-05
Payer: COMMERCIAL

## 2021-02-05 VITALS
WEIGHT: 130 LBS | HEART RATE: 82 BPM | TEMPERATURE: 98 F | RESPIRATION RATE: 16 BRPM | BODY MASS INDEX: 19.7 KG/M2 | OXYGEN SATURATION: 100 % | HEIGHT: 68 IN | DIASTOLIC BLOOD PRESSURE: 95 MMHG | SYSTOLIC BLOOD PRESSURE: 143 MMHG

## 2021-02-05 DIAGNOSIS — R14.2 BURPING: Primary | ICD-10-CM

## 2021-02-05 PROCEDURE — 99213 OFFICE O/P EST LOW 20 MIN: CPT | Performed by: PHYSICIAN ASSISTANT

## 2021-02-05 RX ORDER — POLYETHYLENE GLYCOL 3350 17 G/17G
17 POWDER, FOR SOLUTION ORAL DAILY
COMMUNITY
End: 2021-06-16 | Stop reason: ALTCHOICE

## 2021-02-05 RX ORDER — FAMOTIDINE 20 MG/1
20 TABLET ORAL 2 TIMES DAILY PRN
Qty: 30 TABLET | Refills: 0 | Status: SHIPPED | OUTPATIENT
Start: 2021-02-05 | End: 2021-02-05 | Stop reason: CLARIF

## 2021-02-05 RX ORDER — OMEPRAZOLE 20 MG/1
20 CAPSULE, DELAYED RELEASE ORAL
Qty: 21 CAPSULE | Refills: 0 | Status: SHIPPED | OUTPATIENT
Start: 2021-02-05 | End: 2021-02-26

## 2021-02-05 NOTE — ED PROVIDER NOTES
Patient Seen in: [facility] [address] [place] [address]      History   Patient presents with:  GI Problem    Stated Complaint: gi issues x 30 days    HPI/Subjective:   HPI    CHIEF COMPLAINT: Burping for over a month    HISTORY OF PRESENT ILLNESS: Patient is a listed in today's nurse's notes are reviewed and agree. The patient's family history is reviewed and is noncontributory to the presenting problem, except as indicated as above.     Objective:   Past Medical History:   Diagnosis Date   • Abnormal ultrasound (36.5 °C)   Temp src Temporal   SpO2 100 %   O2 Device None (Room air)       Current:BP (!) 143/95   Pulse 82   Temp 97.7 °F (36.5 °C) (Temporal)   Resp 16   Ht 172.7 cm (5' 8\")   Wt 59 kg   LMP 01/26/2021 (Exact Date)   SpO2 100%   BMI 19.77 kg/m² condition was not or was no longer present. There was no indication for further evaluation, treatment or admission on an emergency basis.   Comprehensive verbal and written discharge and follow-up instructions were provided to help prevent relapse or worse

## 2021-02-05 NOTE — ED INITIAL ASSESSMENT (HPI)
For 1 month, c/o constant burping. Took Gas X and Pepcid with no relief. Also has mild upper abdominal pain. Denies fevers. Has chronic constipation and is taking Miralax. Last BM today.

## 2021-02-10 ENCOUNTER — TELEPHONE (OUTPATIENT)
Dept: INTERNAL MEDICINE CLINIC | Facility: CLINIC | Age: 41
End: 2021-02-10

## 2021-02-10 NOTE — PATIENT INSTRUCTIONS
Use Zofran only as needed for nausea    You May continue the Xanax as needed for anxiety    Continue the omeprazole    Follow-up with psychiatry regarding your medication management for anxiety    If your blood pressure remains elevated after your anxiety If you are taking medicines for high blood pressure, these methods may reduce or end your need for medicines in the future. · Begin a weight-loss program if you are overweight. · Cut back on how much salt you get in your diet. Here’s how to do this:  ?  D with your healthcare provider or pharmacist about what to do. · Consider buying an automatic blood pressure machine. Your provider can make a recommendation. You can get one of these at most pharmacies.   The American Heart Association recommends the follo questions and bring them to your next appointment. If you have pressing concerns about your new medicine or your blood pressure, call your provider. Unless told otherwise, follow up with your healthcare provider or this facility within the next 3 days.   Wh

## 2021-02-10 NOTE — TELEPHONE ENCOUNTER
Spoke to pt. Ptstates started taking Sertraline Sunday morning. States felt okay on Monday. Tuesday \"started struggling\"  Then pt states woke up in the middle of last night, \"cold enveiled my whole body. \"  States heart was racing.   \"shaking a littl

## 2021-02-10 NOTE — PROGRESS NOTES
Adelso Oglesby is a 41year old female. CHIEF COMPLAINT   Multiple symptoms    HPI:   The patient has a hx of anxiety recently started on lexapro- started taking half dose 5mg on Sunday.  She woke up at two in the morning during last night with a cold sen mutation ([facility] 75.) 1/21/2015   • Human papilloma virus infection 05/13,03/12   • Infertility, female     Male factor.     • Internal hemorrhoids 05/04/2020    with rectal bleeding   • Low grade squamous intraepithelial lesion (LGSIL) on cervical Pap smear 05/13 11/10/2020    ANIONGAP 5 11/10/2020     08/01/2017    GFRNAA 102 11/10/2020    GFRAA 117 11/10/2020    CA 8.6 11/10/2020    OSMOCALC 292 11/10/2020    ALKPHO 37 11/10/2020    AST 10 (L) 11/10/2020    ALT 17 11/10/2020    BILT 0.6 11/10/2020    TP 7. follow-up. If her blood pressure remains elevated even after her anxiety is better then she can follow-up in the office.  - education provided for lifestyle changes to help blood pressure.     The patient indicates understanding of these issues and agrees

## 2021-02-10 NOTE — TELEPHONE ENCOUNTER
On Sunday,patient started anxiety medication prescribed by Dr Jenniffer Dos Santos. Since then, she has had chills at night and also has heart palpitations. She \"just doesn't feel well. Triage not available.

## 2021-02-12 ENCOUNTER — TELEPHONE (OUTPATIENT)
Dept: INTERNAL MEDICINE CLINIC | Facility: CLINIC | Age: 41
End: 2021-02-12

## 2021-02-12 NOTE — TELEPHONE ENCOUNTER
Andrews Castro,     We received your order for behavioral navigation. I spoke with your patient to discuss psychiatry options.      I believe your patient is a good candidate for the [facility]. In this clinic, our psychiatrist would

## 2021-03-15 PROBLEM — K59.09 CHRONIC CONSTIPATION: Status: ACTIVE | Noted: 2021-03-15

## 2021-04-13 ENCOUNTER — LAB ENCOUNTER (OUTPATIENT)
Dept: LAB | Facility: HOSPITAL | Age: 41
End: 2021-04-13
Attending: INTERNAL MEDICINE
Payer: COMMERCIAL

## 2021-04-13 DIAGNOSIS — Z01.818 PREOP EXAMINATION: ICD-10-CM

## 2021-04-16 PROBLEM — R19.4 CHANGE IN BOWEL HABITS: Status: ACTIVE | Noted: 2021-04-16

## 2021-04-16 PROBLEM — Z83.71 FAMILY HISTORY OF COLONIC POLYPS: Status: ACTIVE | Noted: 2021-04-16

## 2021-04-16 PROBLEM — Z83.719 FAMILY HISTORY OF COLONIC POLYPS: Status: ACTIVE | Noted: 2021-04-16

## 2021-04-16 PROBLEM — R14.1 GAS PAIN: Status: ACTIVE | Noted: 2021-04-16

## 2021-06-01 ENCOUNTER — TELEPHONE (OUTPATIENT)
Dept: INTERNAL MEDICINE CLINIC | Facility: CLINIC | Age: 41
End: 2021-06-01

## 2021-06-01 ENCOUNTER — TELEMEDICINE (OUTPATIENT)
Dept: INTERNAL MEDICINE CLINIC | Facility: CLINIC | Age: 41
End: 2021-06-01

## 2021-06-01 VITALS
TEMPERATURE: 98 F | WEIGHT: 130 LBS | DIASTOLIC BLOOD PRESSURE: 80 MMHG | HEART RATE: 80 BPM | BODY MASS INDEX: 19.7 KG/M2 | HEIGHT: 68 IN | SYSTOLIC BLOOD PRESSURE: 123 MMHG

## 2021-06-01 DIAGNOSIS — G44.209 TENSION HEADACHE: Primary | ICD-10-CM

## 2021-06-01 PROCEDURE — 3079F DIAST BP 80-89 MM HG: CPT | Performed by: STUDENT IN AN ORGANIZED HEALTH CARE EDUCATION/TRAINING PROGRAM

## 2021-06-01 PROCEDURE — 3008F BODY MASS INDEX DOCD: CPT | Performed by: STUDENT IN AN ORGANIZED HEALTH CARE EDUCATION/TRAINING PROGRAM

## 2021-06-01 PROCEDURE — 99213 OFFICE O/P EST LOW 20 MIN: CPT | Performed by: STUDENT IN AN ORGANIZED HEALTH CARE EDUCATION/TRAINING PROGRAM

## 2021-06-01 PROCEDURE — 3074F SYST BP LT 130 MM HG: CPT | Performed by: STUDENT IN AN ORGANIZED HEALTH CARE EDUCATION/TRAINING PROGRAM

## 2021-06-01 RX ORDER — CYCLOBENZAPRINE HCL 5 MG
5 TABLET ORAL NIGHTLY
Qty: 30 TABLET | Refills: 0 | Status: SHIPPED | OUTPATIENT
Start: 2021-06-01 | End: 2021-06-16 | Stop reason: ALTCHOICE

## 2021-06-01 NOTE — TELEPHONE ENCOUNTER
Patient has migraines that have become more frequent. Virtual/Telephone Consent    [employer] verbally consents to a Virtual/Telephone Check-In service on 6/1/21.   Patient understands and accepts financial responsibility for any deductible, co-insu

## 2021-06-01 NOTE — PROGRESS NOTES
Called pt at 1:23 pm to gather information for VV at 4:00 pm.   No answer. LVM to call us back and ask for [facility].

## 2021-06-01 NOTE — PROGRESS NOTES
Virtual Telephone Check-In    Hector Schwarznight verbally consents to a Virtual/Telephone Check-In visit on 06/01/21. Patient has been referred to the [facility] website at [email or web address] to review the yearly Consent to Treat document.     Patient underst External Shampoo Apply 1 Application topically 3 (three) times a week. • Multiple Vitamins-Minerals (MULTI-VITAMIN/MINERALS) Oral Tab Take 1 tablet by mouth daily.      • Venlafaxine HCl ER (EFFEXOR XR) 37.5 MG Oral Capsule SR 24 Hr Take 1 capsule (37.5 Patient Position: Sitting, Cuff Size: adult)   Pulse 80   Temp 98 °F (36.7 °C) (Temporal)   Ht 5' 8\" (1.727 m)   Wt 130 lb (59 kg)   LMP 05/07/2021 (Approximate)   BMI 19.77 kg/m²    Wt Readings from Last 6 Encounters:  06/01/21 : 130 lb (59 kg)  03/15/21 to ER or call 911 for worsening symptoms or acute distress. Medications side effects, risk and benefits discussed in length. The patient indicates understanding of these issues and agrees to the treatment plan.   The patient is asked to return if symp

## 2021-06-16 ENCOUNTER — OFFICE VISIT (OUTPATIENT)
Dept: FAMILY MEDICINE CLINIC | Facility: CLINIC | Age: 41
End: 2021-06-16
Payer: COMMERCIAL

## 2021-06-16 VITALS
RESPIRATION RATE: 16 BRPM | BODY MASS INDEX: 19.85 KG/M2 | DIASTOLIC BLOOD PRESSURE: 84 MMHG | WEIGHT: 131 LBS | HEIGHT: 68 IN | HEART RATE: 80 BPM | SYSTOLIC BLOOD PRESSURE: 122 MMHG | OXYGEN SATURATION: 98 %

## 2021-06-16 DIAGNOSIS — G44.209 TENSION HEADACHE: Primary | ICD-10-CM

## 2021-06-16 DIAGNOSIS — F41.9 ANXIETY: ICD-10-CM

## 2021-06-16 PROCEDURE — 99214 OFFICE O/P EST MOD 30 MIN: CPT | Performed by: FAMILY MEDICINE

## 2021-06-16 PROCEDURE — 3008F BODY MASS INDEX DOCD: CPT | Performed by: FAMILY MEDICINE

## 2021-06-16 PROCEDURE — 3079F DIAST BP 80-89 MM HG: CPT | Performed by: FAMILY MEDICINE

## 2021-06-16 PROCEDURE — 3074F SYST BP LT 130 MM HG: CPT | Performed by: FAMILY MEDICINE

## 2021-06-16 RX ORDER — ALPRAZOLAM 0.25 MG/1
0.25 TABLET ORAL 2 TIMES DAILY PRN
Qty: 20 TABLET | Refills: 0 | Status: SHIPPED | OUTPATIENT
Start: 2021-06-16

## 2021-06-16 RX ORDER — NAPROXEN 500 MG/1
500 TABLET ORAL 2 TIMES DAILY WITH MEALS
Qty: 28 TABLET | Refills: 0 | Status: SHIPPED | OUTPATIENT
Start: 2021-06-16

## 2021-06-16 NOTE — PROGRESS NOTES
[facility] Progress Note    SUBJECTIVE: Maria Victoria Shore 41year old female is here today for Patient presents with:  Headache: Feels pressure on side of heads,       She has ariana having constant, almost daily headaches, on the sides of her head, a cervical cancer screening 10/22/2020    Pap & HPV negative.          PSH  Past Surgical History:   Procedure Laterality Date   • APPENDECTOMY     • APPENDECTOMY     • COLPOSCOPY, CERVIX W/UPPER ADJACENT VAGINA; W/BIOPSY(S), CERVIX  06/18/2013   • COLPOSCOPY improve on vacation but return on restarting work, I do think stress can be very obviously the primary facotr.     Would then recommend considering trying the venlafaxine to see if this helps modify her anxiety level with stress, in addition to the self car

## 2021-09-02 NOTE — PROGRESS NOTES
HPI:   Che Dubose is a 41year old female here with headaches and anxiety     HA's started in May 2021   Pt felt it was related to stress   Pt felt anxiety caused by stress  If pt fights with her  she can feel a HA coming on instantly     Pt tr Heterozygous factor V Leiden mutation [facility] 1/21/2015   • Human papilloma virus infection 05/13,03/12   • Infertility, female     Male factor.     • Internal hemorrhoids 05/04/2020    with rectal bleeding   • Low grade squamous intraepithelial lesion (LGSIL denies chest pain on exertion  GI: denies abdominal pain,denies heartburn  : denies dysuria, vaginal discharge or itching  MUSCULOSKELETAL: denies back pain  NEURO: denies headaches  PSYCHE: denies depression or anxiety  HEMATOLOGIC: denies hx of anemia

## 2021-10-27 PROBLEM — K59.02 DYSSYNERGIC DEFECATION: Status: ACTIVE | Noted: 2021-04-22

## 2021-10-27 NOTE — H&P
[facility]  Obstetrics and Gynecology   History & Physical  Established     Chief complaint: Patient presents with:  Wellness Visit: mammo: 19, benign findings.     Subjective:     HPI: Donna Cagle is a 35year old  with LMP Patient discharge, pelvic pain, dyspareunia, breast mass, breast pain and hot flashes. After the period. Small greener discharge that is egg white consistency with some irritation at that time. Neurological: Positive for headaches.         No aura   Psych visit.       All:  No Known Allergies    PMH:  Past Medical History:   Diagnosis Date   • Abnormal ultrasound of breast 08/13,02/13   • Anxiety    • ASCUS with positive high risk HPV cervical 03/06/2012   • Belching    • Bloating     EGD 4/16/21 - gastritis Smoker      Smokeless tobacco: Never Used    Vaping Use      Vaping Use: Never used    Alcohol use: No    Drug use: Never     Family History:  Family History   Problem Relation Age of Onset   • Hypertension Father    • Colon Polyps Father    • Heart Disord ligament nodularity. No adnexal masses or tenderness. No cervical motion tenderness. Pelvic floor moderately hypertonic but non-tender per patient. Musculoskeletal:      Comments: Extremities non-tender, no edema.     Neurological: She is alert and orien OSMOLALITY      275 - 295 mOsm/kg 292    eGFR NON-AFR.  AMERICAN      >=60 102    eGFR       >=60 117    AST (SGOT)      15 - 37 U/L 10 (L)    ALT (SGPT)      13 - 56 U/L 17    ALKALINE PHOSPHATASE      37 - 98 U/L 37    Total Bilirubin TO [facility]    Chronic constipation  -     OP REFERRAL TO [facility]    Anxiety  -     VITAMIN D; Future  -     VITAMIN B12; Future  -     IRON AND TIBC;  Future  -     FERRITIN; Future    History of cold sores  - normal      Intermittent cold sores  -Rx Valtrex PRN.  Declines suppression dosing for now    RTC 1 year or sooner PRN    Ronny Valencia MD  [facility] - OBGYN

## 2021-10-28 ENCOUNTER — OFFICE VISIT (OUTPATIENT)
Dept: OBGYN CLINIC | Facility: CLINIC | Age: 41
End: 2021-10-28
Payer: COMMERCIAL

## 2021-10-28 VITALS
HEIGHT: 68 IN | BODY MASS INDEX: 20.31 KG/M2 | DIASTOLIC BLOOD PRESSURE: 63 MMHG | WEIGHT: 134 LBS | HEART RATE: 87 BPM | SYSTOLIC BLOOD PRESSURE: 104 MMHG

## 2021-10-28 DIAGNOSIS — Z30.09 GENERAL COUNSELING AND ADVICE FOR CONTRACEPTIVE MANAGEMENT: ICD-10-CM

## 2021-10-28 DIAGNOSIS — Z12.39 SCREENING FOR BREAST CANCER USING NON-MAMMOGRAM MODALITY: ICD-10-CM

## 2021-10-28 DIAGNOSIS — Z86.19 HISTORY OF COLD SORES: ICD-10-CM

## 2021-10-28 DIAGNOSIS — Z01.411 ENCOUNTER FOR WELL WOMAN EXAM WITH ABNORMAL FINDINGS: Primary | ICD-10-CM

## 2021-10-28 DIAGNOSIS — R92.2 DENSE BREASTS: ICD-10-CM

## 2021-10-28 DIAGNOSIS — M62.89 PELVIC FLOOR DYSFUNCTION IN FEMALE: ICD-10-CM

## 2021-10-28 DIAGNOSIS — F41.9 ANXIETY: ICD-10-CM

## 2021-10-28 DIAGNOSIS — Z71.85 HPV VACCINE COUNSELING: ICD-10-CM

## 2021-10-28 DIAGNOSIS — Z12.4 SCREENING FOR CERVICAL CANCER: ICD-10-CM

## 2021-10-28 DIAGNOSIS — K59.09 CHRONIC CONSTIPATION: ICD-10-CM

## 2021-10-28 DIAGNOSIS — K59.02 DYSSYNERGIC DEFECATION: ICD-10-CM

## 2021-10-28 DIAGNOSIS — Z12.31 ENCOUNTER FOR SCREENING MAMMOGRAM FOR MALIGNANT NEOPLASM OF BREAST: ICD-10-CM

## 2021-10-28 DIAGNOSIS — N89.8 VAGINAL DISCHARGE: ICD-10-CM

## 2021-10-28 PROCEDURE — 3074F SYST BP LT 130 MM HG: CPT | Performed by: OBSTETRICS & GYNECOLOGY

## 2021-10-28 PROCEDURE — 87480 CANDIDA DNA DIR PROBE: CPT | Performed by: OBSTETRICS & GYNECOLOGY

## 2021-10-28 PROCEDURE — 88175 CYTOPATH C/V AUTO FLUID REDO: CPT | Performed by: OBSTETRICS & GYNECOLOGY

## 2021-10-28 PROCEDURE — 3078F DIAST BP <80 MM HG: CPT | Performed by: OBSTETRICS & GYNECOLOGY

## 2021-10-28 PROCEDURE — 3008F BODY MASS INDEX DOCD: CPT | Performed by: OBSTETRICS & GYNECOLOGY

## 2021-10-28 PROCEDURE — 87660 TRICHOMONAS VAGIN DIR PROBE: CPT | Performed by: OBSTETRICS & GYNECOLOGY

## 2021-10-28 PROCEDURE — 87510 GARDNER VAG DNA DIR PROBE: CPT | Performed by: OBSTETRICS & GYNECOLOGY

## 2021-10-28 PROCEDURE — 99396 PREV VISIT EST AGE 40-64: CPT | Performed by: OBSTETRICS & GYNECOLOGY

## 2021-10-28 PROCEDURE — 99214 OFFICE O/P EST MOD 30 MIN: CPT | Performed by: OBSTETRICS & GYNECOLOGY

## 2021-10-28 PROCEDURE — 87624 HPV HI-RISK TYP POOLED RSLT: CPT | Performed by: OBSTETRICS & GYNECOLOGY

## 2021-10-28 RX ORDER — VALACYCLOVIR HYDROCHLORIDE 500 MG/1
1000 TABLET, FILM COATED ORAL 2 TIMES DAILY
Qty: 10 TABLET | Refills: 11 | Status: SHIPPED | OUTPATIENT
Start: 2021-10-28 | End: 2021-10-29

## 2021-12-13 ENCOUNTER — TELEPHONE (OUTPATIENT)
Dept: PHYSICAL THERAPY | Facility: HOSPITAL | Age: 41
End: 2021-12-13

## 2021-12-20 ENCOUNTER — APPOINTMENT (OUTPATIENT)
Dept: PHYSICAL THERAPY | Age: 41
End: 2021-12-20
Attending: OBSTETRICS & GYNECOLOGY
Payer: COMMERCIAL

## 2021-12-27 ENCOUNTER — APPOINTMENT (OUTPATIENT)
Dept: PHYSICAL THERAPY | Age: 41
End: 2021-12-27
Attending: OBSTETRICS & GYNECOLOGY
Payer: COMMERCIAL

## 2022-01-10 ENCOUNTER — TELEPHONE (OUTPATIENT)
Dept: PHYSICAL THERAPY | Facility: HOSPITAL | Age: 42
End: 2022-01-10

## 2022-01-10 ENCOUNTER — APPOINTMENT (OUTPATIENT)
Dept: PHYSICAL THERAPY | Age: 42
End: 2022-01-10
Attending: OBSTETRICS & GYNECOLOGY
Payer: COMMERCIAL

## 2022-01-11 ENCOUNTER — HOSPITAL ENCOUNTER (OUTPATIENT)
Dept: MAMMOGRAPHY | Facility: HOSPITAL | Age: 42
Discharge: HOME | End: 2022-01-11
Attending: OBSTETRICS & GYNECOLOGY
Payer: COMMERCIAL

## 2022-01-11 DIAGNOSIS — R92.2 DENSE BREASTS: ICD-10-CM

## 2022-01-11 DIAGNOSIS — Z12.31 ENCOUNTER FOR SCREENING MAMMOGRAM FOR MALIGNANT NEOPLASM OF BREAST: ICD-10-CM

## 2022-01-11 PROCEDURE — 77063 BREAST TOMOSYNTHESIS BI: CPT | Performed by: OBSTETRICS & GYNECOLOGY

## 2022-01-11 PROCEDURE — 77067 SCR MAMMO BI INCL CAD: CPT | Performed by: OBSTETRICS & GYNECOLOGY

## 2022-01-14 ENCOUNTER — HOSPITAL ENCOUNTER (OUTPATIENT)
Dept: MAMMOGRAPHY | Facility: HOSPITAL | Age: 42
Discharge: HOME | End: 2022-01-14
Attending: OBSTETRICS & GYNECOLOGY
Payer: COMMERCIAL

## 2022-01-14 DIAGNOSIS — R92.2 INCONCLUSIVE MAMMOGRAM: ICD-10-CM

## 2022-01-14 PROBLEM — R92.8 ABNORMAL MAMMOGRAM OF LEFT BREAST: Status: ACTIVE | Noted: 2022-01-14

## 2022-01-14 PROCEDURE — 77065 DX MAMMO INCL CAD UNI: CPT | Performed by: OBSTETRICS & GYNECOLOGY

## 2022-01-14 PROCEDURE — 77061 BREAST TOMOSYNTHESIS UNI: CPT | Performed by: OBSTETRICS & GYNECOLOGY

## 2022-01-17 ENCOUNTER — APPOINTMENT (OUTPATIENT)
Dept: PHYSICAL THERAPY | Age: 42
End: 2022-01-17
Attending: OBSTETRICS & GYNECOLOGY
Payer: COMMERCIAL

## 2022-01-24 ENCOUNTER — APPOINTMENT (OUTPATIENT)
Dept: PHYSICAL THERAPY | Age: 42
End: 2022-01-24
Attending: OBSTETRICS & GYNECOLOGY
Payer: COMMERCIAL

## 2022-01-31 ENCOUNTER — APPOINTMENT (OUTPATIENT)
Dept: PHYSICAL THERAPY | Age: 42
End: 2022-01-31
Attending: OBSTETRICS & GYNECOLOGY
Payer: COMMERCIAL

## 2022-02-03 ENCOUNTER — TELEPHONE (OUTPATIENT)
Dept: PHYSICAL THERAPY | Facility: HOSPITAL | Age: 42
End: 2022-02-03

## 2022-02-07 ENCOUNTER — APPOINTMENT (OUTPATIENT)
Dept: PHYSICAL THERAPY | Facility: HOSPITAL | Age: 42
End: 2022-02-07
Attending: OBSTETRICS & GYNECOLOGY

## 2022-02-14 ENCOUNTER — APPOINTMENT (OUTPATIENT)
Dept: PHYSICAL THERAPY | Age: 42
End: 2022-02-14
Attending: OBSTETRICS & GYNECOLOGY

## 2022-02-21 ENCOUNTER — APPOINTMENT (OUTPATIENT)
Dept: PHYSICAL THERAPY | Age: 42
End: 2022-02-21
Attending: OBSTETRICS & GYNECOLOGY

## 2022-02-28 ENCOUNTER — APPOINTMENT (OUTPATIENT)
Dept: PHYSICAL THERAPY | Facility: HOSPITAL | Age: 42
End: 2022-02-28
Attending: OBSTETRICS & GYNECOLOGY

## 2022-02-28 ENCOUNTER — APPOINTMENT (OUTPATIENT)
Dept: PHYSICAL THERAPY | Age: 42
End: 2022-02-28
Attending: OBSTETRICS & GYNECOLOGY

## 2022-03-04 ENCOUNTER — OFFICE VISIT (OUTPATIENT)
Dept: PHYSICAL THERAPY | Facility: HOSPITAL | Age: 42
End: 2022-03-04
Attending: OBSTETRICS & GYNECOLOGY
Payer: COMMERCIAL

## 2022-03-04 DIAGNOSIS — K59.09 CHRONIC CONSTIPATION: ICD-10-CM

## 2022-03-04 DIAGNOSIS — K59.02 DYSSYNERGIC DEFECATION: ICD-10-CM

## 2022-03-04 DIAGNOSIS — M62.89 PELVIC FLOOR DYSFUNCTION IN FEMALE: ICD-10-CM

## 2022-03-04 PROCEDURE — 97140 MANUAL THERAPY 1/> REGIONS: CPT

## 2022-03-04 PROCEDURE — 97112 NEUROMUSCULAR REEDUCATION: CPT

## 2022-03-04 PROCEDURE — 97161 PT EVAL LOW COMPLEX 20 MIN: CPT

## 2022-03-04 NOTE — PROGRESS NOTES
MUSCULOSKELETAL AND PELVIC FLOOR EVALUATION:   Referring Physician: Dr. Cabrera Soares  Diagnosis: Dyssynergic defecation (K59.02)  Chronic constipation (K59.09)  Pelvic floor dysfunction in female (M62.89)      Date of Service: 3/4/2022     PATIENT SUMMARY   Pascual Stokes \"Zoila\" is a 35year old female who presents to therapy today with complaints of chronic constipation that started ~25 years ago. She has attempted to find ways to alleviate her symptoms but nothing has really helped. She does notice that increased anxiety and traveling definitely exacerbate her symptoms. Stool consistency is often hard, \"pellet like\", and painful to push out, but stool consistency does improve when she is taking Miralax regularly. She also reports increased urinary urgency and frequency of urination, often having to go within 30 minutes of drinking water. She denies issues with urinary or fecal incontinence. She denies pain with intercourse but she does report increased pelvic floor tension and difficulty relaxing muscles during intercourse. Current symptoms include: urgency/frequency and constipation    Pt describes pain level during bowel movement when not taking Miralax: current 0/10, best 0/10, worst 6/10. Quality: aching    Pregnant Now: No  Obstetrical/Gynecological history: : 1  Para: 1  Delivery method:   Occupation/Activities: Ultrasound technician  PFDI-20: 31.25/300; Impairment= 10.4 %    Brianda describes prior level of function as having long history of constipation. Pt goals include to decrease constipation. Past medical history was reviewed with Brianda.  She  has a past medical history of Abnormal ultrasound of breast (,), Anxiety, ASCUS with positive high risk HPV cervical (2012), Belching, Bloating, Chronic constipation, Constipation, Dyssynergic defecation (2021), Elevated fasting glucose (11/10/2020), Flatulence/gas pain/belching, Hemorrhoids, Heterozygous factor V Leiden mutation ([facility] [address] (1/21/2015), History of cold sores, Human papilloma virus infection (05/13,03/12), Infertility, female, Internal hemorrhoids (05/04/2020), Low grade squamous intraepithelial lesion (LGSIL) on cervical Pap smear (05/13,12/12), Menstrual migraine, Pap smear for cervical cancer screening (10/28/2021), and Tension headache. Precautions:  None    URINARY HABITS  Types of symptoms: other: urgency/frequency  Events associated with the onset of urinary complaints: N/A  Abdominal/Vaginal Pressure complaints: No  Urinary Frequency: 8 + times per day  Urine Stream: WNL  Amount: Decreased  Leaking occurs: No  Nocturia: up to 1x/night  Fluid Intake: Poor  Bladder irritants: Coffee  Urine Stop test: Yes  Post void dribble: No  Hovering: No  Empty bladder just in case: Yes  Do you ever leak urine without knowing it? No    BOWEL HABITS  Types of symptoms: Constipation   Frequency of bowel movements: 1x/day to 1x every 3 days  Stool consistency: Wilkin Stool Scale: 1, 2 regularly; 4 on miralax  Do you strain with defecation: Yes   Laxative use: No    SEXUAL HEALTH STATUS  Marinoff Scale: 0  History of Sexual Abuse: No  Sexual [place] Status: Active  Pain with initial and/or deep penetration: No  Pain with pelvic exam/tampon use: No    ASSESSMENT  Rashaun Hong presents to physical therapy evaluation with primary c/o chronic constipation. The results of the objective tests and measures show decreased length of hip musculature, decreased hip strength, mild tissue restrictions in lower abdomen, moderate tissue restrictions in pelvic floor and decreased volitional ability to relax/elongate pelvic floor musculature. Patient additionally is not eating adequate amounts of dietary fiber or drinking enough water, which very likely contributes to her constipation. Functional deficits include but are not limited to pain, difficulty, and irregularity of bowel movements.   Signs and symptoms are consistent with diagnosis of chronic constipation due to dietary practices and pelvic floor dysfunction. Pt and PT discussed evaluation findings, pathology, POC and HEP. Pt voiced understanding and performs HEP correctly without reported pain. Skilled Pelvic Physical Therapy is medically necessary to address the above impairments and reach functional goals. OBJECTIVE:   Posture: WNL  Pelvic Alignment: WNL  Deep Tendon Reflexes:  Patella: R 2+, L 2+;        Achilles: R 2+, L 2+  Gait: pt ambulates on level ground with normal mechanics. External Palpation: Mild TTP in L lower abdominal quadrant  Scars (location/surgery):  section scar observed; appears to have healed well with no tenderness  Abdominal Wall Integrity: Mild tissue restrictions in bilateral lower abdominal quadrants    Range Of Motion  Lumbar AROM screen: Mild decrease in lumbar extension  LE AROM screen: grossly WNL    Strength (MMT) 5/5 JAM LE except 3/5 abductors, extensors, hip flexors  Transverse Abdominis: 4/5    Flexibility Summary: WNL JAM LE except mild deficits in adductors, hip flexors L>R      Informed consent for internal pelvic evaluation given: Yes    External Observation:   Voluntary contraction: present   Voluntary relaxation: present  Involuntary contraction: present  Involuntary relaxation: absent    Mons pubis: WNL  Labia majora: WNL  Labia minora: WNL  Urethral meatus: WNL  Introitus: WNL  Perineal body: WNL    Sensory/Reflex:  Vestibule: normal bilaterally  Anal [place]: Not Tested    Internal Examination     Pelvic Floor Muscle strength: (PERF= Power/Endurance/Reps/Fast) MMT: 4/10/6/10  External Anal Sphincter: Not tested  Accessory Muscle Use: none      Eccentric lengthening contraction: Present but diminished  Bearing down Valsalva maneuver (2-3x):  Able but cues required     Internal Palpation: WNL except Compress Urethra/Sphincter JAM moderate restriction  Urethrovaginalis JAM moderate restriction  Pubococcygeus/Pubovaginalis BILmoderate restriction  Iliococcygeus L>R moderate restriction  Coccygeus L>R moderate restriction  Obturator Internus L>R severe restriction    Today's Treatment and Response:   Patient education was provided on objective findings of external and internal evaluation and expectations with treatment outcomes. Educated on pelvic anatomy and function with diagrams and pelvic model, bladder normatives, adequate hydration levels, proper toileting posture, stress/urge urinary incontinence strategies and diaphragmatic breathing for PNS activation and pelvic floor relaxation     Neuro: to improve motor control, proprioception, mind/body connection  Extensive education provided on pelvic floor and bladder/bowel anatomy and function and strategies discussed for PFM down-training and decreased urinary urgency/frequency. Discussed strategies for decreasing void frequency including PFM quick flick contractions and distraction techniques to re-establish optimal brain/bladder connection via cinthya's loop 3. Discussed ultimate goal of decreasing frequency to 1x every 3-4 hours. Discussed optimal fiber and water intake to improve fecal motility in colon, as well as abdominal massage technique to improve transit time. Discussed squatty potty use, decreased valsalva maneuvering, and \"belly big, belly hard\" technique for optimal defecation mechanics. 4 handouts provided including HEP. Today's treatment and HEP:   Access Code: [email or web address]  Supine Pelvic Floor Stretch - 5 x weekly - 3 reps  Diaphragmatic Breathing in Child's Pose with Pelvic Floor Relaxation - 1 x daily - 5 x weekly - 3 reps  Quadruped Diaphragmatic Breathing - 1 x daily - 5 x weekly - 3 reps  Cat Cow - 5 x weekly - 3 reps  Deep Squat with Pelvic Floor Relaxation - 1 x daily - 5 x weekly - 3 reps - 1 min hold    Manual: to improve tissue extensibility, mitigate pain  Internal exam performed and cues provided on proper PFM contraction, relaxation, and bearing down.  Cued in proper PFM elongation with coordinated inhalation to decrease PFM tension. 10 mins  -Lower abdominal colon massage to simulate peristaltic contraction 8 mins        Charges: PT Eval Low Complexity, Neuro x 2, Manual x 1      Total Timed Treatment: 43 min     Total Treatment Time: 86 min     Based on clinical rationale and outcome measures, this evaluation involved Low Complexity   PLAN OF CARE:    Goals: (to be met in 10 visits)  Patient will demonstrate optimal PFM elongation with coordinated breathing x 10 reps to alleviate PFM pain and muscle tension. Patient will report intake of at least 20 grams of fiber per day for improved regularity of bowel movements. Patient will report at least 7-8 cups of water per day for optimal bowel and bladder health. Patient will demonstrate adductor/hip flexor muscle length WNL to alleviate tension in support structures of pelvic floor musculature. Patient will report full relaxation of PFM during micturition to restore inverse neurological relationship of pelvic floor and bladder via cinthya's loop 3. Patient will report adherence to HEP for continued exercise benefits following cessation of PT. Frequency / Duration: Patient will be seen for 1-2 x/week or a total of 10 visits over a 90 day period. Treatment will include: Manual Therapy, Neuromuscular Re-education, Self-Care Home Management, Therapeutic Activities, Therapeutic Exercise, Home Exercise Program instruction, Modalities to include: Electrical stimulation (attended) and biofeedback and biofeedback     Education or treatment limitation: None  Rehab Potential:good      Patient/Family/Caregiver was advised of these findings, precautions, and treatment options and has agreed to actively participate in planning and for this course of care. Thank you for your referral. Please co-sign or sign and return this letter via fax as soon as possible to [phone].  If you have any questions, please contact me at Dept: [phone]    Sincerely,  Electronically signed by therapist: Kanika Loera PT  [de-identified] certification required: Yes  I certify the need for these services furnished under this plan of treatment and while under my care.     X___________________________________________________ Date____________________    Certification From: 0/3/2885  To:6/2/2022

## 2022-03-07 ENCOUNTER — APPOINTMENT (OUTPATIENT)
Dept: PHYSICAL THERAPY | Facility: HOSPITAL | Age: 42
End: 2022-03-07
Attending: OBSTETRICS & GYNECOLOGY

## 2022-03-11 ENCOUNTER — OFFICE VISIT (OUTPATIENT)
Dept: PHYSICAL THERAPY | Facility: HOSPITAL | Age: 42
End: 2022-03-11
Attending: OBSTETRICS & GYNECOLOGY
Payer: COMMERCIAL

## 2022-03-11 DIAGNOSIS — K59.02 DYSSYNERGIC DEFECATION: ICD-10-CM

## 2022-03-11 DIAGNOSIS — M62.89 PELVIC FLOOR DYSFUNCTION IN FEMALE: ICD-10-CM

## 2022-03-11 DIAGNOSIS — K59.09 CHRONIC CONSTIPATION: ICD-10-CM

## 2022-03-11 PROCEDURE — 97112 NEUROMUSCULAR REEDUCATION: CPT

## 2022-03-11 PROCEDURE — 97110 THERAPEUTIC EXERCISES: CPT

## 2022-03-11 PROCEDURE — 97140 MANUAL THERAPY 1/> REGIONS: CPT

## 2022-03-14 ENCOUNTER — APPOINTMENT (OUTPATIENT)
Dept: PHYSICAL THERAPY | Facility: HOSPITAL | Age: 42
End: 2022-03-14
Attending: OBSTETRICS & GYNECOLOGY

## 2022-03-15 ENCOUNTER — OFFICE VISIT (OUTPATIENT)
Dept: PHYSICAL THERAPY | Facility: HOSPITAL | Age: 42
End: 2022-03-15
Attending: OBSTETRICS & GYNECOLOGY
Payer: COMMERCIAL

## 2022-03-15 DIAGNOSIS — K59.02 DYSSYNERGIC DEFECATION: ICD-10-CM

## 2022-03-15 DIAGNOSIS — K59.09 CHRONIC CONSTIPATION: ICD-10-CM

## 2022-03-15 DIAGNOSIS — M62.89 PELVIC FLOOR DYSFUNCTION IN FEMALE: ICD-10-CM

## 2022-03-15 PROCEDURE — 97110 THERAPEUTIC EXERCISES: CPT

## 2022-03-15 PROCEDURE — 97140 MANUAL THERAPY 1/> REGIONS: CPT

## 2022-03-21 ENCOUNTER — APPOINTMENT (OUTPATIENT)
Dept: PHYSICAL THERAPY | Facility: HOSPITAL | Age: 42
End: 2022-03-21
Attending: OBSTETRICS & GYNECOLOGY

## 2022-03-22 ENCOUNTER — OFFICE VISIT (OUTPATIENT)
Dept: PHYSICAL THERAPY | Facility: HOSPITAL | Age: 42
End: 2022-03-22
Attending: OBSTETRICS & GYNECOLOGY
Payer: COMMERCIAL

## 2022-03-22 DIAGNOSIS — M62.89 PELVIC FLOOR DYSFUNCTION IN FEMALE: ICD-10-CM

## 2022-03-22 DIAGNOSIS — K59.09 CHRONIC CONSTIPATION: ICD-10-CM

## 2022-03-22 DIAGNOSIS — K59.02 DYSSYNERGIC DEFECATION: ICD-10-CM

## 2022-03-22 PROCEDURE — 97110 THERAPEUTIC EXERCISES: CPT

## 2022-03-22 PROCEDURE — 97140 MANUAL THERAPY 1/> REGIONS: CPT

## 2022-03-22 PROCEDURE — 97112 NEUROMUSCULAR REEDUCATION: CPT

## 2022-03-28 ENCOUNTER — APPOINTMENT (OUTPATIENT)
Dept: PHYSICAL THERAPY | Facility: HOSPITAL | Age: 42
End: 2022-03-28
Attending: OBSTETRICS & GYNECOLOGY

## 2022-03-29 ENCOUNTER — OFFICE VISIT (OUTPATIENT)
Dept: PHYSICAL THERAPY | Facility: HOSPITAL | Age: 42
End: 2022-03-29
Attending: OBSTETRICS & GYNECOLOGY
Payer: COMMERCIAL

## 2022-03-29 PROCEDURE — 97112 NEUROMUSCULAR REEDUCATION: CPT

## 2022-03-29 PROCEDURE — 97140 MANUAL THERAPY 1/> REGIONS: CPT

## 2022-04-04 ENCOUNTER — APPOINTMENT (OUTPATIENT)
Dept: PHYSICAL THERAPY | Facility: HOSPITAL | Age: 42
End: 2022-04-04
Attending: OBSTETRICS & GYNECOLOGY

## 2022-04-05 ENCOUNTER — APPOINTMENT (OUTPATIENT)
Dept: PHYSICAL THERAPY | Facility: HOSPITAL | Age: 42
End: 2022-04-05
Attending: OBSTETRICS & GYNECOLOGY
Payer: COMMERCIAL

## 2022-04-11 ENCOUNTER — APPOINTMENT (OUTPATIENT)
Dept: PHYSICAL THERAPY | Facility: HOSPITAL | Age: 42
End: 2022-04-11
Attending: OBSTETRICS & GYNECOLOGY

## 2022-04-12 ENCOUNTER — APPOINTMENT (OUTPATIENT)
Dept: PHYSICAL THERAPY | Facility: HOSPITAL | Age: 42
End: 2022-04-12
Attending: OBSTETRICS & GYNECOLOGY
Payer: COMMERCIAL

## 2022-04-18 ENCOUNTER — APPOINTMENT (OUTPATIENT)
Dept: PHYSICAL THERAPY | Facility: HOSPITAL | Age: 42
End: 2022-04-18
Attending: OBSTETRICS & GYNECOLOGY

## 2022-04-19 ENCOUNTER — APPOINTMENT (OUTPATIENT)
Dept: PHYSICAL THERAPY | Facility: HOSPITAL | Age: 42
End: 2022-04-19
Attending: OBSTETRICS & GYNECOLOGY
Payer: COMMERCIAL

## 2022-04-26 ENCOUNTER — APPOINTMENT (OUTPATIENT)
Dept: PHYSICAL THERAPY | Facility: HOSPITAL | Age: 42
End: 2022-04-26
Attending: OBSTETRICS & GYNECOLOGY
Payer: COMMERCIAL

## 2022-05-03 ENCOUNTER — APPOINTMENT (OUTPATIENT)
Dept: PHYSICAL THERAPY | Facility: HOSPITAL | Age: 42
End: 2022-05-03
Attending: OBSTETRICS & GYNECOLOGY
Payer: COMMERCIAL

## 2022-07-15 ENCOUNTER — HOSPITAL ENCOUNTER (OUTPATIENT)
Dept: MAMMOGRAPHY | Facility: HOSPITAL | Age: 42
Discharge: HOME | End: 2022-07-15
Attending: OBSTETRICS & GYNECOLOGY
Payer: COMMERCIAL

## 2022-07-15 DIAGNOSIS — R92.8 ABNORMAL MAMMOGRAM OF LEFT BREAST: ICD-10-CM

## 2022-07-15 PROCEDURE — 77061 BREAST TOMOSYNTHESIS UNI: CPT | Performed by: OBSTETRICS & GYNECOLOGY

## 2022-07-15 PROCEDURE — 77065 DX MAMMO INCL CAD UNI: CPT | Performed by: OBSTETRICS & GYNECOLOGY

## 2022-08-10 ENCOUNTER — TELEPHONE (OUTPATIENT)
Dept: OBGYN CLINIC | Facility: CLINIC | Age: 42
End: 2022-08-10

## 2022-08-10 DIAGNOSIS — Z12.31 ENCOUNTER FOR SCREENING MAMMOGRAM FOR MALIGNANT NEOPLASM OF BREAST: Primary | ICD-10-CM

## 2022-08-10 DIAGNOSIS — R92.1 BREAST CALCIFICATIONS: ICD-10-CM

## 2022-08-12 ENCOUNTER — TELEPHONE (OUTPATIENT)
Dept: OBGYN CLINIC | Facility: CLINIC | Age: 42
End: 2022-08-12

## 2022-08-12 DIAGNOSIS — R92.1 BREAST CALCIFICATIONS ON MAMMOGRAM: Primary | ICD-10-CM

## 2022-08-12 NOTE — TELEPHONE ENCOUNTER
Patient with calcifications in left breast. Extremely dense breasts   Diagnostic mammo LEFT breast 7/15/22  Radiologist recommended follow up diagnostic mammogram bilateral breasts in 6 months. Patient interested in seeing breast surgeon. Order for Dr. Moni Garza placed.      Diagnoses and all orders for this visit:    Breast calcifications on mammogram  -     OP Nayeli Corbett MD

## 2022-09-22 DIAGNOSIS — N76.0 VAGINITIS AND VULVOVAGINITIS: Primary | ICD-10-CM

## 2022-10-20 ENCOUNTER — APPOINTMENT (OUTPATIENT)
Dept: LAB | Facility: HOSPITAL | Age: 42
End: 2022-10-20
Attending: PREVENTIVE MEDICINE

## 2022-10-20 DIAGNOSIS — Z23 NEED FOR VACCINATION: Primary | ICD-10-CM

## 2022-10-20 PROCEDURE — 90471 IMMUNIZATION ADMIN: CPT

## 2022-10-24 ENCOUNTER — OFFICE VISIT (OUTPATIENT)
Dept: OBGYN CLINIC | Facility: CLINIC | Age: 42
End: 2022-10-24
Payer: COMMERCIAL

## 2022-10-24 DIAGNOSIS — N89.8 VAGINAL ITCHING: Primary | ICD-10-CM

## 2022-10-24 PROCEDURE — 87660 TRICHOMONAS VAGIN DIR PROBE: CPT

## 2022-10-24 PROCEDURE — 87480 CANDIDA DNA DIR PROBE: CPT

## 2022-10-24 PROCEDURE — 87510 GARDNER VAG DNA DIR PROBE: CPT

## 2022-10-31 ENCOUNTER — TELEPHONE (OUTPATIENT)
Facility: CLINIC | Age: 42
End: 2022-10-31

## 2022-11-23 ENCOUNTER — LAB ENCOUNTER (OUTPATIENT)
Dept: LAB | Age: 42
End: 2022-11-23
Attending: STUDENT IN AN ORGANIZED HEALTH CARE EDUCATION/TRAINING PROGRAM
Payer: COMMERCIAL

## 2022-11-23 ENCOUNTER — OFFICE VISIT (OUTPATIENT)
Dept: OBGYN CLINIC | Facility: CLINIC | Age: 42
End: 2022-11-23
Payer: COMMERCIAL

## 2022-11-23 VITALS
SYSTOLIC BLOOD PRESSURE: 111 MMHG | WEIGHT: 136 LBS | HEART RATE: 83 BPM | BODY MASS INDEX: 20.61 KG/M2 | DIASTOLIC BLOOD PRESSURE: 74 MMHG | HEIGHT: 68 IN

## 2022-11-23 DIAGNOSIS — Z01.419 ENCOUNTER FOR ANNUAL ROUTINE GYNECOLOGICAL EXAMINATION: Primary | ICD-10-CM

## 2022-11-23 DIAGNOSIS — N64.4 BREAST TENDERNESS: ICD-10-CM

## 2022-11-23 DIAGNOSIS — Z01.419 ENCOUNTER FOR ANNUAL ROUTINE GYNECOLOGICAL EXAMINATION: ICD-10-CM

## 2022-11-23 LAB
ALBUMIN SERPL-MCNC: 4.1 G/DL (ref 3.4–5)
ALBUMIN/GLOB SERPL: 1.4 {RATIO} (ref 1–2)
ALP LIVER SERPL-CCNC: 39 U/L
ALT SERPL-CCNC: 19 U/L
ANION GAP SERPL CALC-SCNC: 7 MMOL/L (ref 0–18)
AST SERPL-CCNC: 13 U/L (ref 15–37)
BASOPHILS # BLD AUTO: 0.03 X10(3) UL (ref 0–0.2)
BASOPHILS NFR BLD AUTO: 0.5 %
BILIRUB SERPL-MCNC: 0.8 MG/DL (ref 0.1–2)
BUN BLD-MCNC: 16 MG/DL (ref 7–18)
BUN/CREAT SERPL: 21.3 (ref 10–20)
CALCIUM BLD-MCNC: 9 MG/DL (ref 8.5–10.1)
CHLORIDE SERPL-SCNC: 107 MMOL/L (ref 98–112)
CHOLEST SERPL-MCNC: 149 MG/DL (ref ?–200)
CO2 SERPL-SCNC: 26 MMOL/L (ref 21–32)
CREAT BLD-MCNC: 0.75 MG/DL
DEPRECATED RDW RBC AUTO: 44.3 FL (ref 35.1–46.3)
EOSINOPHIL # BLD AUTO: 0.02 X10(3) UL (ref 0–0.7)
EOSINOPHIL NFR BLD AUTO: 0.3 %
ERYTHROCYTE [DISTWIDTH] IN BLOOD BY AUTOMATED COUNT: 12.9 % (ref 11–15)
EST. AVERAGE GLUCOSE BLD GHB EST-MCNC: 100 MG/DL (ref 68–126)
FASTING PATIENT LIPID ANSWER: YES
FASTING STATUS PATIENT QL REPORTED: YES
GFR SERPLBLD BASED ON 1.73 SQ M-ARVRAT: 101 ML/MIN/1.73M2 (ref 60–?)
GLOBULIN PLAS-MCNC: 3 G/DL (ref 2.8–4.4)
GLUCOSE BLD-MCNC: 98 MG/DL (ref 70–99)
HBA1C MFR BLD: 5.1 % (ref ?–5.7)
HCT VFR BLD AUTO: 41.6 %
HDLC SERPL-MCNC: 62 MG/DL (ref 40–59)
HGB BLD-MCNC: 13.4 G/DL
IMM GRANULOCYTES # BLD AUTO: 0.02 X10(3) UL (ref 0–1)
IMM GRANULOCYTES NFR BLD: 0.3 %
LDLC SERPL CALC-MCNC: 76 MG/DL (ref ?–100)
LYMPHOCYTES # BLD AUTO: 1.56 X10(3) UL (ref 1–4)
LYMPHOCYTES NFR BLD AUTO: 25.4 %
MCH RBC QN AUTO: 30.2 PG (ref 26–34)
MCHC RBC AUTO-ENTMCNC: 32.2 G/DL (ref 31–37)
MCV RBC AUTO: 93.9 FL
MONOCYTES # BLD AUTO: 0.44 X10(3) UL (ref 0.1–1)
MONOCYTES NFR BLD AUTO: 7.2 %
NEUTROPHILS # BLD AUTO: 4.07 X10 (3) UL (ref 1.5–7.7)
NEUTROPHILS # BLD AUTO: 4.07 X10(3) UL (ref 1.5–7.7)
NEUTROPHILS NFR BLD AUTO: 66.3 %
NONHDLC SERPL-MCNC: 87 MG/DL (ref ?–130)
OSMOLALITY SERPL CALC.SUM OF ELEC: 291 MOSM/KG (ref 275–295)
PLATELET # BLD AUTO: 276 10(3)UL (ref 150–450)
POTASSIUM SERPL-SCNC: 4.5 MMOL/L (ref 3.5–5.1)
PROT SERPL-MCNC: 7.1 G/DL (ref 6.4–8.2)
RBC # BLD AUTO: 4.43 X10(6)UL
SODIUM SERPL-SCNC: 140 MMOL/L (ref 136–145)
TRIGL SERPL-MCNC: 53 MG/DL (ref 30–149)
TSI SER-ACNC: 1.16 MIU/ML (ref 0.36–3.74)
VIT D+METAB SERPL-MCNC: 20.6 NG/ML (ref 30–100)
VLDLC SERPL CALC-MCNC: 8 MG/DL (ref 0–30)
WBC # BLD AUTO: 6.1 X10(3) UL (ref 4–11)

## 2022-11-23 PROCEDURE — 80061 LIPID PANEL: CPT | Performed by: STUDENT IN AN ORGANIZED HEALTH CARE EDUCATION/TRAINING PROGRAM

## 2022-11-23 PROCEDURE — 80050 GENERAL HEALTH PANEL: CPT | Performed by: STUDENT IN AN ORGANIZED HEALTH CARE EDUCATION/TRAINING PROGRAM

## 2022-11-23 PROCEDURE — 87624 HPV HI-RISK TYP POOLED RSLT: CPT | Performed by: STUDENT IN AN ORGANIZED HEALTH CARE EDUCATION/TRAINING PROGRAM

## 2022-11-23 PROCEDURE — 83036 HEMOGLOBIN GLYCOSYLATED A1C: CPT | Performed by: STUDENT IN AN ORGANIZED HEALTH CARE EDUCATION/TRAINING PROGRAM

## 2022-11-23 PROCEDURE — 82306 VITAMIN D 25 HYDROXY: CPT | Performed by: STUDENT IN AN ORGANIZED HEALTH CARE EDUCATION/TRAINING PROGRAM

## 2022-11-23 RX ORDER — VALACYCLOVIR HYDROCHLORIDE 500 MG/1
1000 TABLET, FILM COATED ORAL 2 TIMES DAILY
COMMUNITY
Start: 2022-11-03

## 2022-11-23 NOTE — PATIENT INSTRUCTIONS
1) For vaginal itching/irritation after periods, can try \"Rephresh\" over the counter, helps to keep the pH of vagina at normal levels, preventing recurrent BV infections.  Can also use the baking soda soaks as recommended by Vaginal-ID test for lactobacillus iners:  -Baking soda sitz bath - dissolve a few tablespoons of baking soda in warm water and soak vulva for at least 10 minutes up to 3 times per day    2) Breast tenderness:  -can try ibuprofen 600mg daily during worst mid-cycle times  -can also use this dose (600mg daily) starting 1-2 days before periods, continued for up to 5 days, which can help to lighten flow of periods on first day    3) Consider making appt to see Dr. Charanjit White, breast surgery, for other recommendations    4) Annual labs -- blood draw at any [facility] lab    5) Wrist splints for possible carpal tunnel - any kind of splint that keeps wrist straight and \"neutral\" (I.e. not curled down), just wear at night so hands don't get numb overnight    6) Think about seeing a psychiatrist about anxiety, possible medications that are different than escitalopram (so hopefully won't have bad side effect of even more anxiety)

## 2022-11-25 LAB — HPV I/H RISK 1 DNA SPEC QL NAA+PROBE: NEGATIVE

## 2023-01-11 ENCOUNTER — OFFICE VISIT (OUTPATIENT)
Dept: OBGYN CLINIC | Facility: CLINIC | Age: 43
End: 2023-01-11
Payer: COMMERCIAL

## 2023-01-11 VITALS
HEIGHT: 68 IN | BODY MASS INDEX: 20.61 KG/M2 | HEART RATE: 75 BPM | SYSTOLIC BLOOD PRESSURE: 120 MMHG | WEIGHT: 136 LBS | DIASTOLIC BLOOD PRESSURE: 82 MMHG

## 2023-01-11 DIAGNOSIS — N89.8 VAGINAL DISCHARGE: Primary | ICD-10-CM

## 2023-01-11 PROCEDURE — 3008F BODY MASS INDEX DOCD: CPT | Performed by: STUDENT IN AN ORGANIZED HEALTH CARE EDUCATION/TRAINING PROGRAM

## 2023-01-11 PROCEDURE — 3079F DIAST BP 80-89 MM HG: CPT | Performed by: STUDENT IN AN ORGANIZED HEALTH CARE EDUCATION/TRAINING PROGRAM

## 2023-01-11 PROCEDURE — 99213 OFFICE O/P EST LOW 20 MIN: CPT | Performed by: STUDENT IN AN ORGANIZED HEALTH CARE EDUCATION/TRAINING PROGRAM

## 2023-01-11 PROCEDURE — 87660 TRICHOMONAS VAGIN DIR PROBE: CPT | Performed by: STUDENT IN AN ORGANIZED HEALTH CARE EDUCATION/TRAINING PROGRAM

## 2023-01-11 PROCEDURE — 3074F SYST BP LT 130 MM HG: CPT | Performed by: STUDENT IN AN ORGANIZED HEALTH CARE EDUCATION/TRAINING PROGRAM

## 2023-01-11 PROCEDURE — 87510 GARDNER VAG DNA DIR PROBE: CPT | Performed by: STUDENT IN AN ORGANIZED HEALTH CARE EDUCATION/TRAINING PROGRAM

## 2023-01-11 PROCEDURE — 87480 CANDIDA DNA DIR PROBE: CPT | Performed by: STUDENT IN AN ORGANIZED HEALTH CARE EDUCATION/TRAINING PROGRAM

## 2023-01-20 ENCOUNTER — HOSPITAL ENCOUNTER (OUTPATIENT)
Dept: MAMMOGRAPHY | Facility: HOSPITAL | Age: 43
Discharge: HOME | End: 2023-01-20
Attending: OBSTETRICS & GYNECOLOGY
Payer: COMMERCIAL

## 2023-01-20 DIAGNOSIS — Z12.31 ENCOUNTER FOR SCREENING MAMMOGRAM FOR MALIGNANT NEOPLASM OF BREAST: ICD-10-CM

## 2023-01-20 DIAGNOSIS — R92.1 BREAST CALCIFICATIONS: ICD-10-CM

## 2023-01-20 PROCEDURE — 77066 DX MAMMO INCL CAD BI: CPT | Performed by: OBSTETRICS & GYNECOLOGY

## 2023-01-20 PROCEDURE — 77062 BREAST TOMOSYNTHESIS BI: CPT | Performed by: OBSTETRICS & GYNECOLOGY

## 2023-01-23 NOTE — PROGRESS NOTES
Patient aware of Mammogram results and recommendations. Diagnostic mammogram stable/benign appearing. FOLLOW-UP DIAGNOSTIC MAMMOGRAM BILATERAL BREASTS IN 12 MONTHS. Patient verbalized understanding.

## 2023-02-09 ENCOUNTER — TELEPHONE (OUTPATIENT)
Facility: CLINIC | Age: 43
End: 2023-02-09

## 2023-02-09 NOTE — TELEPHONE ENCOUNTER
Transaction inquiry reviewed. Patient informed the $30 charge is associated with vaginal swab that was done. Patient verbalizes understanding.

## 2023-02-09 NOTE — TELEPHONE ENCOUNTER
Patient is inquiring about charges from 1/11/23 visit with Dr. Rush Kitchen.  Patient paid $30 copay and received additional bill for $30

## 2023-04-18 ENCOUNTER — OFFICE VISIT (OUTPATIENT)
Dept: FAMILY MEDICINE CLINIC | Facility: CLINIC | Age: 43
End: 2023-04-18
Payer: COMMERCIAL

## 2023-04-18 VITALS
BODY MASS INDEX: 20.61 KG/M2 | SYSTOLIC BLOOD PRESSURE: 112 MMHG | HEIGHT: 68 IN | OXYGEN SATURATION: 97 % | WEIGHT: 136 LBS | DIASTOLIC BLOOD PRESSURE: 74 MMHG | HEART RATE: 72 BPM | RESPIRATION RATE: 16 BRPM

## 2023-04-18 DIAGNOSIS — M54.50 CHRONIC BILATERAL LOW BACK PAIN WITHOUT SCIATICA: ICD-10-CM

## 2023-04-18 DIAGNOSIS — M79.2 NEUROPATHIC PAIN: Primary | ICD-10-CM

## 2023-04-18 DIAGNOSIS — G89.29 CHRONIC BILATERAL LOW BACK PAIN WITHOUT SCIATICA: ICD-10-CM

## 2023-04-18 DIAGNOSIS — M77.01 MEDIAL EPICONDYLITIS OF RIGHT ELBOW: ICD-10-CM

## 2023-04-18 PROCEDURE — 99214 OFFICE O/P EST MOD 30 MIN: CPT | Performed by: FAMILY MEDICINE

## 2023-04-18 PROCEDURE — 3074F SYST BP LT 130 MM HG: CPT | Performed by: FAMILY MEDICINE

## 2023-04-18 PROCEDURE — 3008F BODY MASS INDEX DOCD: CPT | Performed by: FAMILY MEDICINE

## 2023-04-18 PROCEDURE — 3078F DIAST BP <80 MM HG: CPT | Performed by: FAMILY MEDICINE

## 2023-04-18 RX ORDER — NAPROXEN 500 MG/1
500 TABLET ORAL 2 TIMES DAILY WITH MEALS
Qty: 28 TABLET | Refills: 0 | Status: SHIPPED | OUTPATIENT
Start: 2023-04-18

## 2023-05-01 ENCOUNTER — TELEPHONE (OUTPATIENT)
Dept: FAMILY MEDICINE CLINIC | Facility: CLINIC | Age: 43
End: 2023-05-01

## 2023-05-01 NOTE — TELEPHONE ENCOUNTER
Patient states that she is dealing with some anxiety and depression and was looking for recommendations. She was inquiring if she can get sooner appt with LE. Please advise.

## 2023-05-17 ENCOUNTER — DOCUMENTATION ONLY (OUTPATIENT)
Dept: FAMILY MEDICINE CLINIC | Facility: CLINIC | Age: 43
End: 2023-05-17

## 2023-05-25 ENCOUNTER — PATIENT MESSAGE (OUTPATIENT)
Dept: FAMILY MEDICINE CLINIC | Facility: CLINIC | Age: 43
End: 2023-05-25

## 2023-11-09 ENCOUNTER — OFFICE VISIT (OUTPATIENT)
Dept: FAMILY MEDICINE CLINIC | Facility: CLINIC | Age: 43
End: 2023-11-09
Payer: COMMERCIAL

## 2023-11-09 VITALS
WEIGHT: 132 LBS | BODY MASS INDEX: 20 KG/M2 | HEART RATE: 83 BPM | HEIGHT: 68 IN | RESPIRATION RATE: 16 BRPM | OXYGEN SATURATION: 99 % | DIASTOLIC BLOOD PRESSURE: 80 MMHG | SYSTOLIC BLOOD PRESSURE: 134 MMHG

## 2023-11-09 DIAGNOSIS — Z00.00 ROUTINE GENERAL MEDICAL EXAMINATION AT A HEALTH CARE FACILITY: Primary | ICD-10-CM

## 2023-11-09 PROCEDURE — 90686 IIV4 VACC NO PRSV 0.5 ML IM: CPT | Performed by: INTERNAL MEDICINE

## 2023-11-09 PROCEDURE — 90471 IMMUNIZATION ADMIN: CPT | Performed by: INTERNAL MEDICINE

## 2023-11-09 PROCEDURE — 3075F SYST BP GE 130 - 139MM HG: CPT | Performed by: INTERNAL MEDICINE

## 2023-11-09 PROCEDURE — 3079F DIAST BP 80-89 MM HG: CPT | Performed by: INTERNAL MEDICINE

## 2023-11-09 PROCEDURE — 99396 PREV VISIT EST AGE 40-64: CPT | Performed by: INTERNAL MEDICINE

## 2023-11-09 PROCEDURE — 3008F BODY MASS INDEX DOCD: CPT | Performed by: INTERNAL MEDICINE

## 2023-11-09 RX ORDER — SULFACETAMIDE SODIUM AND SULFUR 10; 5 MG/G; MG/G
RINSE TOPICAL
COMMUNITY
Start: 2023-09-01

## 2023-11-10 ENCOUNTER — LAB ENCOUNTER (OUTPATIENT)
Dept: LAB | Age: 43
End: 2023-11-10
Attending: INTERNAL MEDICINE
Payer: COMMERCIAL

## 2023-11-10 DIAGNOSIS — Z00.00 ROUTINE GENERAL MEDICAL EXAMINATION AT A HEALTH CARE FACILITY: ICD-10-CM

## 2023-11-10 LAB
ALBUMIN SERPL-MCNC: 4.1 G/DL (ref 3.4–5)
ALBUMIN/GLOB SERPL: 1.2 {RATIO} (ref 1–2)
ALP LIVER SERPL-CCNC: 39 U/L
ALT SERPL-CCNC: 19 U/L
ANION GAP SERPL CALC-SCNC: 5 MMOL/L (ref 0–18)
AST SERPL-CCNC: 16 U/L (ref 15–37)
BASOPHILS # BLD AUTO: 0.03 X10(3) UL (ref 0–0.2)
BASOPHILS NFR BLD AUTO: 0.6 %
BILIRUB SERPL-MCNC: 0.6 MG/DL (ref 0.1–2)
BUN BLD-MCNC: 14 MG/DL (ref 9–23)
CALCIUM BLD-MCNC: 8.6 MG/DL (ref 8.5–10.1)
CHLORIDE SERPL-SCNC: 108 MMOL/L (ref 98–112)
CHOLEST SERPL-MCNC: 156 MG/DL (ref ?–200)
CO2 SERPL-SCNC: 26 MMOL/L (ref 21–32)
CREAT BLD-MCNC: 0.84 MG/DL
EGFRCR SERPLBLD CKD-EPI 2021: 87 ML/MIN/1.73M2 (ref 60–?)
EOSINOPHIL # BLD AUTO: 0.06 X10(3) UL (ref 0–0.7)
EOSINOPHIL NFR BLD AUTO: 1.2 %
ERYTHROCYTE [DISTWIDTH] IN BLOOD BY AUTOMATED COUNT: 13.3 %
FASTING PATIENT LIPID ANSWER: YES
FASTING STATUS PATIENT QL REPORTED: YES
GLOBULIN PLAS-MCNC: 3.4 G/DL (ref 2.8–4.4)
GLUCOSE BLD-MCNC: 95 MG/DL (ref 70–99)
HBV SURFACE AB SER QL: NONREACTIVE
HBV SURFACE AB SERPL IA-ACNC: <3.1 MIU/ML
HCT VFR BLD AUTO: 39.7 %
HDLC SERPL-MCNC: 63 MG/DL (ref 40–59)
HGB BLD-MCNC: 13.1 G/DL
IMM GRANULOCYTES # BLD AUTO: 0.01 X10(3) UL (ref 0–1)
IMM GRANULOCYTES NFR BLD: 0.2 %
LDLC SERPL CALC-MCNC: 85 MG/DL (ref ?–100)
LYMPHOCYTES # BLD AUTO: 1.48 X10(3) UL (ref 1–4)
LYMPHOCYTES NFR BLD AUTO: 29.7 %
MCH RBC QN AUTO: 30.8 PG (ref 26–34)
MCHC RBC AUTO-ENTMCNC: 33 G/DL (ref 31–37)
MCV RBC AUTO: 93.4 FL
MONOCYTES # BLD AUTO: 0.34 X10(3) UL (ref 0.1–1)
MONOCYTES NFR BLD AUTO: 6.8 %
NEUTROPHILS # BLD AUTO: 3.06 X10 (3) UL (ref 1.5–7.7)
NEUTROPHILS # BLD AUTO: 3.06 X10(3) UL (ref 1.5–7.7)
NEUTROPHILS NFR BLD AUTO: 61.5 %
NONHDLC SERPL-MCNC: 93 MG/DL (ref ?–130)
OSMOLALITY SERPL CALC.SUM OF ELEC: 288 MOSM/KG (ref 275–295)
PLATELET # BLD AUTO: 292 10(3)UL (ref 150–450)
POTASSIUM SERPL-SCNC: 3.9 MMOL/L (ref 3.5–5.1)
PROT SERPL-MCNC: 7.5 G/DL (ref 6.4–8.2)
RBC # BLD AUTO: 4.25 X10(6)UL
RUBV IGG SER QL: POSITIVE
RUBV IGG SER-ACNC: >500 IU/ML (ref 10–?)
SODIUM SERPL-SCNC: 139 MMOL/L (ref 136–145)
T4 FREE SERPL-MCNC: 0.9 NG/DL (ref 0.8–1.7)
TRIGL SERPL-MCNC: 31 MG/DL (ref 30–149)
TSI SER-ACNC: 1.13 MIU/ML (ref 0.36–3.74)
VLDLC SERPL CALC-MCNC: 5 MG/DL (ref 0–30)
WBC # BLD AUTO: 5 X10(3) UL (ref 4–11)

## 2023-11-10 PROCEDURE — 86735 MUMPS ANTIBODY: CPT | Performed by: INTERNAL MEDICINE

## 2023-11-10 PROCEDURE — 84439 ASSAY OF FREE THYROXINE: CPT | Performed by: INTERNAL MEDICINE

## 2023-11-10 PROCEDURE — 80061 LIPID PANEL: CPT | Performed by: INTERNAL MEDICINE

## 2023-11-10 PROCEDURE — 86765 RUBEOLA ANTIBODY: CPT | Performed by: INTERNAL MEDICINE

## 2023-11-10 PROCEDURE — 86787 VARICELLA-ZOSTER ANTIBODY: CPT | Performed by: INTERNAL MEDICINE

## 2023-11-10 PROCEDURE — 86480 TB TEST CELL IMMUN MEASURE: CPT | Performed by: INTERNAL MEDICINE

## 2023-11-10 PROCEDURE — 86762 RUBELLA ANTIBODY: CPT | Performed by: INTERNAL MEDICINE

## 2023-11-10 PROCEDURE — 80050 GENERAL HEALTH PANEL: CPT | Performed by: INTERNAL MEDICINE

## 2023-11-10 PROCEDURE — 86706 HEP B SURFACE ANTIBODY: CPT | Performed by: INTERNAL MEDICINE

## 2023-11-11 DIAGNOSIS — Z23 NEED FOR HEPATITIS B VACCINATION: Primary | ICD-10-CM

## 2023-11-11 PROBLEM — R14.1 GAS PAIN: Status: RESOLVED | Noted: 2021-04-16 | Resolved: 2023-11-11

## 2023-11-13 LAB
M TB IFN-G CD4+ T-CELLS BLD-ACNC: 0.18 IU/ML
M TB TUBERC IFN-G BLD QL: NEGATIVE
M TB TUBERC IGNF/MITOGEN IGNF CONTROL: >10 IU/ML
MEV IGG SER-ACNC: 106 AU/ML (ref 16.5–?)
MUV IGG SER IA-ACNC: 68.1 AU/ML (ref 11–?)
QFT TB1 AG MINUS NIL: -0.03 IU/ML
QFT TB2 AG MINUS NIL: 0 IU/ML
VZV IGG SER IA-ACNC: >4000 (ref 165–?)

## 2023-11-15 ENCOUNTER — APPOINTMENT (OUTPATIENT)
Dept: OTHER | Facility: HOSPITAL | Age: 43
End: 2023-11-15
Attending: PREVENTIVE MEDICINE

## 2023-11-15 ENCOUNTER — NURSE ONLY (OUTPATIENT)
Dept: FAMILY MEDICINE CLINIC | Facility: CLINIC | Age: 43
End: 2023-11-15
Payer: COMMERCIAL

## 2023-11-15 PROCEDURE — 90746 HEPB VACCINE 3 DOSE ADULT IM: CPT | Performed by: INTERNAL MEDICINE

## 2023-11-15 PROCEDURE — 90471 IMMUNIZATION ADMIN: CPT | Performed by: INTERNAL MEDICINE

## 2023-12-18 ENCOUNTER — NURSE ONLY (OUTPATIENT)
Dept: FAMILY MEDICINE CLINIC | Facility: CLINIC | Age: 43
End: 2023-12-18
Payer: COMMERCIAL

## 2023-12-18 DIAGNOSIS — Z23 NEED FOR HEPATITIS B VACCINATION: Primary | ICD-10-CM

## 2023-12-18 PROCEDURE — 90746 HEPB VACCINE 3 DOSE ADULT IM: CPT | Performed by: FAMILY MEDICINE

## 2023-12-18 PROCEDURE — 90471 IMMUNIZATION ADMIN: CPT | Performed by: FAMILY MEDICINE

## 2024-01-04 ENCOUNTER — OFFICE VISIT (OUTPATIENT)
Dept: PHYSICAL THERAPY | Age: 44
End: 2024-01-04
Attending: FAMILY MEDICINE
Payer: COMMERCIAL

## 2024-01-04 ENCOUNTER — TELEPHONE (OUTPATIENT)
Dept: PHYSICAL THERAPY | Facility: HOSPITAL | Age: 44
End: 2024-01-04

## 2024-01-04 DIAGNOSIS — M77.01 MEDIAL EPICONDYLITIS OF RIGHT ELBOW: Primary | ICD-10-CM

## 2024-01-04 DIAGNOSIS — M54.50 CHRONIC BILATERAL LOW BACK PAIN WITHOUT SCIATICA: ICD-10-CM

## 2024-01-04 DIAGNOSIS — G89.29 CHRONIC BILATERAL LOW BACK PAIN WITHOUT SCIATICA: ICD-10-CM

## 2024-01-04 PROCEDURE — 97140 MANUAL THERAPY 1/> REGIONS: CPT

## 2024-01-04 PROCEDURE — 97161 PT EVAL LOW COMPLEX 20 MIN: CPT

## 2024-01-04 NOTE — PROGRESS NOTES
SPINE EVALUATION:     Diagnosis:   Low back pain      Referring Provider: Baldemar  Date of Evaluation:    1/4/2024    Precautions:  None Next MD visit:   none scheduled  Date of Surgery: n/a     PATIENT SUMMARY   Brianda Tavarez is a 43 year old female who presents to therapy today with complaints of low back pain acute episode onset December 2023. Pain with bending, difficulty getting up from lying down and sitting, difficulty turning over in bed. Injury occurred after lifting her daughter and sitting for long commute and at work as an [employer] technician. Walking helps to loosen up the back. Initially pt was given medication, currently only taking Ibuprofen.   Pt describes pain level current 4/10, at best 2/10, at worst 8/10.   Current functional limitations include pain with change of positions, pain with bending forwards, pain with sitting    Brianda describes prior level of function h/o back problems in the past, though not as bad as this episode. Working full time as an US tech, walking for exercise. Pt goals include to decrease pain and be able to move pain free.  Past medical history was reviewed with Brianda. Significant findings include Anxiety  Pt denies diplopia, dysarthria, dysphasia, dizziness, drop attacks, bowel/bladder changes, saddle anesthesia, and JAM LE N/T.    ASSESSMENT  Brianda presents to physical therapy evaluation with primary c/o severe low back pain for 2 weeks. The results of the objective tests and measures show limited AROM lumbar spine flex 20 deg, ext 30 deg, side flex 20 deg, straightening of lumbar lordosis, tightness of hamstrings, tightness of quadriceps, +ve SLR L>R @ 50 deg, no weakness or paresthesia, hypomobility with PA L4,5.  Functional deficits include but are not limited to pain with bending, sitting, changing positions.  Signs and symptoms are consistent with diagnosis of lumbar spine dysfunction, ? Derangement of lumbar disc L5. Pt and PT discussed evaluation findings,  pathology, POC and HEP.  Pt voiced understanding and performs HEP correctly without reported pain. Skilled Physical Therapy is medically necessary to address the above impairments and reach functional goals.     OBJECTIVE:   Observation/Posture: Straightening of lordosis Lumbar spine  Neuro Screen: Intact    Lumbar spine AROM: (* denotes performed with pain)  Flexion: 20 deg*  Extension: 30 deg*  Sidebending: R 30 deg; L 30 deg  Rotation: R 30 deg; L 30 deg    Accessory motion: Hypomobility L4,5   Palpation: Tenderness L4,5. R lumbar paraspinals more tight than L with spasm palpable    Strength: LE 5/5                   Core 3+/5    Flexibility:   LE   Hip Flexor: minimal tightness  Hamstrings: R 50; L 45  Piriformis: minimal tightness  Quads: moderate tightness  Gastroc-soleus: minimal tightness     Special tests:   +ve SLR L > R    Gait: pt ambulates on level ground with normal mechanics.  Balance: SLS normal    Today’s Treatment and Response: Prone press up x 10, prone knee flexion R/L x 10, hook lying LTR x 10, prone PA mobilizations Gr 2 30 secs each L4,5, STM lumbar parspinals R/L 5 mins, standing lumbar spine extension x 8  Pt education was provided on exam findings, treatment diagnosis, treatment plan, expectations, and prognosis. Pt was also provided recommendations for activity modifications, postural corrections, ergonomics, and importance of remaining active  Patient was instructed in and issued a HEP for:   - Prone Press Up  - 2 x daily - 7 x weekly - 1 sets - 10 reps  - Standing Lumbar Extension  - 4 x daily - 7 x weekly - 1 sets - 10 reps  - Supine Lower Trunk Rotation  - 2 x daily - 7 x weekly - 1 sets - 10 reps  - Prone Knee Flexion  - 2 x daily - 7 x weekly - 1 sets - 10 reps    Charges: PT Eval Low Complexity, MT 1      Total Timed Treatment: 15 min     Total Treatment Time: 45 min     PLAN OF CARE:    Goals: (to be met in 8 visits)   Restore full lumbar spine extension 40 deg, rotation 40 deg to  enable pt to sleep comfortably at night  Restore lumbar flexion to 30 deg to enable pt to get up from sitting without pain  -ve SLR R/L to 60 deg without LBP  Pt will report no difficulty with changing of positions, bathing or dressing    Frequency / Duration: Patient will be seen for 2 x/week or a total of 8 visits over a 90 day period. Treatment will include: Manual Therapy, Neuromuscular Re-education, Therapeutic Exercise, and Home Exercise Program instruction    Education or treatment limitation: None  Rehab Potential:good    Oswestry Disability Index Score  Score: 26 % (1/4/2024  1:53 PM)      Patient/Family/Caregiver was advised of these findings, precautions, and treatment options and has agreed to actively participate in planning and for this course of care.    Thank you for your referral. Please co-sign or sign and return this letter via fax as soon as possible to [phone]. If you have any questions, please contact me at Dept: [phone]    Sincerely,  Electronically signed by therapist: Colette Kingsley, PT    Physician's certification required: Yes  I certify the need for these services furnished under this plan of treatment and while under my care.    X___________________________________________________ Date____________________    Certification From: 1/4/2024  To:4/3/2024

## 2024-01-05 ENCOUNTER — TELEPHONE (OUTPATIENT)
Dept: PHYSICAL THERAPY | Facility: HOSPITAL | Age: 44
End: 2024-01-05

## 2024-01-09 ENCOUNTER — APPOINTMENT (OUTPATIENT)
Dept: PHYSICAL THERAPY | Age: 44
End: 2024-01-09
Attending: FAMILY MEDICINE
Payer: COMMERCIAL

## 2024-01-09 ENCOUNTER — OFFICE VISIT (OUTPATIENT)
Dept: PHYSICAL THERAPY | Age: 44
End: 2024-01-09
Attending: FAMILY MEDICINE
Payer: COMMERCIAL

## 2024-01-09 PROCEDURE — 97110 THERAPEUTIC EXERCISES: CPT

## 2024-01-09 PROCEDURE — 97140 MANUAL THERAPY 1/> REGIONS: CPT

## 2024-01-09 NOTE — PROGRESS NOTES
Diagnosis:   Acute LBP      Referring Provider: Baldemar  Date of Evaluation:    1/4/2023    Precautions:  None Next MD visit:   none scheduled  Date of Surgery: n/a   Insurance Primary/Secondary: [employer] OUT OF STATE / N/A     # Auth Visits: 8            Subjective: The back is feeling better with the exercises. Able to sit only for 5-10 mins, pain when getting up, pain with getting in and out of the car.     Pain: 3/10      Objective: Pt appears more comfortable, less guarded                     L side paraspinal tension L3-5                     Tenderness with PA L4,5 central and unilateral L                     +ve SLR L @ 50 deg      Assessment: Pt is responding well to extension exercises, continue current HEP. Added abd bracing bent leg lift, bridging, handout issued.      Goals: (to be met in 8 visits)   Restore full lumbar spine extension 40 deg, rotation 40 deg to enable pt to sleep comfortably at night  Restore lumbar flexion to 30 deg to enable pt to get up from sitting without pain  -ve SLR R/L to 60 deg without LBP  Pt will report no difficulty with changing of positions, bathing or dressing    Plan: Continue 1 x per week for lumbar spine extension, PA mobilizations, hamstring flexibility, basic core stabilization training.  Date: 1/9/2024  TX#: 2/8 Date:                 TX#: 3/ Date:                 TX#: 4/ Date:                 TX#: 5/ Date:   Tx#: 6/   Prone press up x 10  Prone alt knee flexion R/L x 10       Prone PA mobilizations Gr 2-3 L3,4,5 3 x 30 secs  STM lumbar paraspinals 5 mins       Hook lying LTR x 10  Abd bracing bent leg lift R/L x 10  Gentle hamstring stretch from 90/90 position knee ext R/L x 10  Bridging x 10       Prone press up x 10  Quads stretch R/L 3 x 10 sec hold  Abd bracing during transitional movements turning over on tx table and getting up from lying  Standing repeated lumbar spine extension x 10  HEP review and new instructions 2 mins       HEP: Prone press up x 10 2-3 x  per day, extension in standing x 10 hourly, prone alt knee flexion 2 x 10, hook lying bent leg march 2 x 10, bridging 2 x 10    Charges: EX 2, MT 1       Total Timed Treatment: There ex 30 min, Manual 15 min min  Total Treatment Time: 45 min

## 2024-01-10 ENCOUNTER — APPOINTMENT (OUTPATIENT)
Dept: PHYSICAL THERAPY | Age: 44
End: 2024-01-10
Attending: FAMILY MEDICINE
Payer: COMMERCIAL

## 2024-01-12 ENCOUNTER — APPOINTMENT (OUTPATIENT)
Dept: PHYSICAL THERAPY | Age: 44
End: 2024-01-12
Attending: FAMILY MEDICINE
Payer: COMMERCIAL

## 2024-01-16 ENCOUNTER — OFFICE VISIT (OUTPATIENT)
Dept: PHYSICAL THERAPY | Age: 44
End: 2024-01-16
Attending: FAMILY MEDICINE
Payer: COMMERCIAL

## 2024-01-16 PROCEDURE — 97110 THERAPEUTIC EXERCISES: CPT

## 2024-01-16 PROCEDURE — 97140 MANUAL THERAPY 1/> REGIONS: CPT

## 2024-01-16 NOTE — PROGRESS NOTES
Diagnosis:   Acute LBP      Referring Provider: Baldemar  Date of Evaluation:    1/4/2023    Precautions:  None Next MD visit:   none scheduled  Date of Surgery: n/a   Insurance Primary/Secondary: [employer] OUT OF STATE / N/A     # Auth Visits: 8            Subjective: Pt is over all feeling better.  She notices symptoms with prolonged sitting.  Pain: 3/10    Objective:                      L side paraspinal tension L3-5                     Tenderness with PA L4,5 central and unilateral L                    TrA abdominal brace with biofeedback stabilizer, 40-60mmHg    Assessment:  Continued abdominal strengthening today with focus on transverse abdominus contraction.  Pt tolerated activities today without increase in pain.    Goals: (to be met in 8 visits)   Restore full lumbar spine extension 40 deg, rotation 40 deg to enable pt to sleep comfortably at night  Restore lumbar flexion to 30 deg to enable pt to get up from sitting without pain  -ve SLR R/L to 60 deg without LBP  Pt will report no difficulty with changing of positions, bathing or dressing    Plan: Continue 1 x per week for lumbar spine extension, PA mobilizations, hamstring flexibility, basic core stabilization training.  Date: 1/9/2024  TX#: 2/8 Date:                 TX#: 3/8 Date:                 TX#: 4/ Date:                 TX#: 5/ Date:   Tx#: 6/   Prone press up x 10  Prone alt knee flexion R/L x 10 Prone glut max raise, x10      Prone PA mobilizations Gr 2-3 L3,4,5 3 x 30 secs  STM lumbar paraspinals 5 mins Prone PA mobilizations Gr 2-3 L2-4 30 secs  STM lumbar paraspinals 5 mins      Hook lying LTR x 10  Abd bracing bent leg lift R/L x 10  Gentle hamstring stretch from 90/90 position knee ext R/L x 10  Bridging x 10 Hook lying LTR x 10  SB ham curl, x20  SB Bridging x 10      Prone press up x 10  Quads stretch R/L 3 x 10 sec hold  Abd bracing during transitional movements turning over on tx table and getting up from lying  Standing repeated lumbar  spine extension x 10  HEP review and new instructions 2 mins Biofeedback stabilizer:  Tra brace: x10 (40-50 mmHg)  TrA brace, x10, march, R/L  TrA brace with clam, R/L yel    Shuttle - shuttle  DL: x20, 3c      HEP: Prone press up x 10 2-3 x per day, extension in standing x 10 hourly, prone alt knee flexion 2 x 10, hook lying bent leg march 2 x 10, bridging 2 x 10    Charges: EX 2, MT 1       Total Timed Treatment: There ex 30 min, Manual 15 min min  Total Treatment Time: 45 min

## 2024-01-19 ENCOUNTER — TELEPHONE (OUTPATIENT)
Dept: PHYSICAL THERAPY | Facility: HOSPITAL | Age: 44
End: 2024-01-19

## 2024-01-19 ENCOUNTER — APPOINTMENT (OUTPATIENT)
Dept: PHYSICAL THERAPY | Age: 44
End: 2024-01-19
Attending: FAMILY MEDICINE
Payer: COMMERCIAL

## 2024-01-23 ENCOUNTER — APPOINTMENT (OUTPATIENT)
Dept: PHYSICAL THERAPY | Age: 44
End: 2024-01-23
Attending: FAMILY MEDICINE
Payer: COMMERCIAL

## 2024-01-24 ENCOUNTER — TELEPHONE (OUTPATIENT)
Dept: FAMILY MEDICINE CLINIC | Facility: CLINIC | Age: 44
End: 2024-01-24

## 2024-01-24 DIAGNOSIS — R92.1 BREAST CALCIFICATIONS: Primary | ICD-10-CM

## 2024-01-24 DIAGNOSIS — Z12.31 ENCOUNTER FOR SCREENING MAMMOGRAM FOR MALIGNANT NEOPLASM OF BREAST: ICD-10-CM

## 2024-02-07 ENCOUNTER — OFFICE VISIT (OUTPATIENT)
Dept: PHYSICAL THERAPY | Age: 44
End: 2024-02-07
Attending: FAMILY MEDICINE
Payer: COMMERCIAL

## 2024-02-07 ENCOUNTER — OFFICE VISIT (OUTPATIENT)
Dept: OBGYN CLINIC | Facility: CLINIC | Age: 44
End: 2024-02-07
Payer: COMMERCIAL

## 2024-02-07 VITALS
HEIGHT: 68 IN | SYSTOLIC BLOOD PRESSURE: 132 MMHG | WEIGHT: 134.5 LBS | BODY MASS INDEX: 20.38 KG/M2 | HEART RATE: 87 BPM | DIASTOLIC BLOOD PRESSURE: 87 MMHG

## 2024-02-07 DIAGNOSIS — R92.343 EXTREMELY DENSE TISSUE OF BOTH BREASTS ON MAMMOGRAPHY: ICD-10-CM

## 2024-02-07 DIAGNOSIS — Z12.31 ENCOUNTER FOR SCREENING MAMMOGRAM FOR MALIGNANT NEOPLASM OF BREAST: ICD-10-CM

## 2024-02-07 DIAGNOSIS — Z01.419 ENCOUNTER FOR ANNUAL ROUTINE GYNECOLOGICAL EXAMINATION: Primary | ICD-10-CM

## 2024-02-07 DIAGNOSIS — R92.1 BREAST CALCIFICATIONS ON MAMMOGRAM: ICD-10-CM

## 2024-02-07 DIAGNOSIS — N92.4 MENORRHAGIA, PREMENOPAUSAL: ICD-10-CM

## 2024-02-07 PROCEDURE — 97110 THERAPEUTIC EXERCISES: CPT

## 2024-02-07 PROCEDURE — 97140 MANUAL THERAPY 1/> REGIONS: CPT

## 2024-02-07 PROCEDURE — 3075F SYST BP GE 130 - 139MM HG: CPT | Performed by: STUDENT IN AN ORGANIZED HEALTH CARE EDUCATION/TRAINING PROGRAM

## 2024-02-07 PROCEDURE — 99396 PREV VISIT EST AGE 40-64: CPT | Performed by: STUDENT IN AN ORGANIZED HEALTH CARE EDUCATION/TRAINING PROGRAM

## 2024-02-07 PROCEDURE — 3079F DIAST BP 80-89 MM HG: CPT | Performed by: STUDENT IN AN ORGANIZED HEALTH CARE EDUCATION/TRAINING PROGRAM

## 2024-02-07 PROCEDURE — 3008F BODY MASS INDEX DOCD: CPT | Performed by: STUDENT IN AN ORGANIZED HEALTH CARE EDUCATION/TRAINING PROGRAM

## 2024-02-07 PROCEDURE — 87624 HPV HI-RISK TYP POOLED RSLT: CPT | Performed by: STUDENT IN AN ORGANIZED HEALTH CARE EDUCATION/TRAINING PROGRAM

## 2024-02-07 NOTE — PROGRESS NOTES
[facility]  Obstetrics and Gynecology   History & Physical    Chief complaint:   Chief Complaint   Patient presents with    Wellness Visit     Last pap smear was 22, negative     Vaginal Problem     Vaginal scratch, a week ago     Irregular Periods     Periods are sooner than 28 days, last period had a bunch of clots and inconsistent with her flow. Is she going into early menopause?         HPI: Brianda Tavarez is a 43 year old  with Patient's last menstrual period was 01/15/2024 (exact date).      Pt here for annual GYN exam  Last week noticed a sore area or scratch at vulva, saw doctor with [employer] where she has been working and they did a vaginitis swab which was negative  Pt has been using boric acid suppositories because sometimes after periods will get itching/irritation    Menses are still monthly, about q23-28d, flow is different each month, sometimes is normal, sometimes has \"flooding\" - a lot will come out at once with clot. Happened  2-3 times this past year, during  it actually went through her clothes    Hx Prior Abnormal Pap: Yes (hx of cyro)  Pap Date: 22  Pap Result Notes: negative    PMHx/Surghx reviewed, below. Of note: factor V leiden heterozygous      PCP: Jessie Hanley,     Review of Systems:  Constitutional:  Denies fatigue, night sweats, hot flashes  Eyes:  denies blurred or double vision  Cardiovascular:  denies chest pain or palpitations  Respiratory:  denies shortness of breath  Gastrointestinal:  denies heartburn, abdominal pain, diarrhea or constipation  Genitourinary:  denies dysuria, incontinence, +sometimes abnormal vaginal discharge, +vaginal itching  Musculoskeletal:  denies back pain.  Skin/Breast:  Denies any breast lumps, or discharge. +tenderness some times  Neurological:  denies headaches, extremity weakness or numbness.  Psychiatric: denies depression or anxiety.  Endocrine:   denies excessive thirst or urination.  Heme/Lymph:  denies  history of anemia, easy bruising or bleeding.    OB History:  OB History    Para Term  AB Living   1 1 1     1   SAB IAB Ectopic Multiple Live Births           1      # Outcome Date GA Lbr Alcides/2nd Weight Sex Delivery Anes PTL Lv   1 Term 05/11/15 39w6d 16:15 / 02:17 8 lb 1.5 oz (3.67 kg) F CS-LTranv EPI N YAMIL      Complications: Fetal tachycardia       Meds:  Current Outpatient Medications on File Prior to Visit   Medication Sig Dispense Refill    Ketoconazole 2 % External Shampoo Apply 1 Application topically 3 (three) times a week.      Sulfacetamide Sodium-Sulfur 10-5 % External Liquid Apply topically to affected areas on face 2 times a day as a facial cleanser. Rinse off well. (Patient not taking: Reported on 2023)       No current facility-administered medications on file prior to visit.       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 Normal A1c at same time.     Flatulence/gas pain/belching     Hemorrhoids     Heterozygous factor V Leiden mutation (HCC) 2015    History of cold sores     gets them right under her nose    Human papilloma virus infection ,    Infertility, female     Male factor.     Internal hemorrhoids 2020    with rectal bleeding    Low grade squamous intraepithelial lesion (LGSIL) on cervical Pap smear , hpv positive    Menstrual migraine     No aura     Pap smear for cervical cancer screening 10/28/2021    Pap & HPV negative.     Tension headache     worsening around 2021. Increased stress &  anxiety.        PSH:  Past Surgical History:   Procedure Laterality Date    APPENDECTOMY            COLONOSCOPY  2021    No polyps. Biopsy of duodenum normal. Done for chronic constipation/change in bowel habits    COLPOSCOPY, CERVIX W/UPPER ADJACENT VAGINA; W/BIOPSY(S), CERVIX  2013    COLPOSCOPY, CERVIX W/UPPER ADJACENT VAGINA; W/BIOPSY(S), CERVIX  2012    CRYOCAUTERY OF CERVIX  2013    DIAGNOSTIC ANOSCOPY  2020    Internal hemorrhoids - Dr. Margaret Pizano     EGD  2021 - impression: gastritis, LA Class A esophagitis. Biopsies negative for inflammation.     KATHERIN LOCALIZATION WIRE 1 SITE LEFT (CPT=19281) Left     papilloma age 25 - removed     TONSILLECTOMY         Social History:  Social History     Socioeconomic History    Marital status:      Spouse name: Not on file    Number of children: Not on file    Years of education: Not on file    Highest education level: Not on file   Occupational History    Not on file   Tobacco Use    Smoking status: Never    Smokeless tobacco: Never   Vaping Use    Vaping Use: Never used   Substance and Sexual Activity    Alcohol use: No    Drug use: Never    Sexual activity: Yes     Partners: Male   Other Topics Concern     Service Not Asked    Blood Transfusions Not Asked    Caffeine Concern No    Occupational Exposure Not Asked    Hobby Hazards Not Asked    Sleep Concern Not Asked    Stress Concern Not Asked    Weight Concern Not Asked    Special Diet Not Asked    Back Care Not Asked    Exercise No    Bike Helmet Not Asked    Seat Belt Yes    Self-Exams Yes   Social History Narrative    Not on file     Social Determinants of Health     Financial Resource Strain: Not on file   Food Insecurity: Not on file   Transportation Needs: Not on file   Physical Activity: Not on file   Stress: Not on file   Social Connections: Not on file   Housing Stability: Not on file        Family History:  Family History   Problem  Relation Age of Onset    Hypertension Father     Colon Polyps Father     Heart Disorder Mother     Pacemaker Mother     Hypertension Mother     No Known Problems Daughter     No Known Problems Maternal Grandmother     No Known Problems Maternal Grandfather     No Known Problems Paternal Grandmother     No Known Problems Paternal Grandfather     Breast Cancer Maternal Cousin Female         dx age 60's    Ovarian Cancer Neg     Colon Cancer Neg        PHYSICAL EXAM:     Vitals:    24 1603   BP: 132/87   Pulse: 87   Weight: 134 lb 7.7 oz (61 kg)   Height: 68\"       Body mass index is 20.45 kg/m².      Constitutional: well developed, well nourished  Head/Face: normocephalic  Neck/Thyroid: thyroid symmetric, no thyromegaly, no nodules, no adenopathy  Lymphatic: no abnormal supraclavicular or axillary adenopathy is noted  Breast: normal without palpable masses, tenderness, asymmetry, nipple discharge, nipple retraction or skin changes  Abdomen:  soft, nontender, nondistended, no masses  Skin/Hair: no unusual rashes or bruises  Extremities: no edema, no cyanosis  Psychiatric:  Oriented to time, place, person and situation. Appropriate mood and affect    Pelvic Exam:  External Genitalia: normal appearance, hair distribution, and no lesions  Urethral Meatus:  normal in size, location, without lesions and prolapse  Bladder:  No fullness, masses or tenderness  Vagina:  Normal appearance without lesions, no abnormal discharge  Cervix:  Normal without tenderness on motion  Uterus: normal in size, contour, position, mobility, without tenderness  Adnexa: normal without masses or tenderness  Perineum: normal  Anus: no hemorrhoids     Assessment & Plan:     Brianda Tavarez is a 43 year old      Diagnoses and all orders for this visit:    Encounter for annual routine gynecological examination  -     ThinPrep PAP Smear; Future  -     Hpv Dna  High Risk , Thin Prep Collect; Future  -     Image-Guided Pap Smear  ([facility])    Breast calcifications on mammogram  -     [facility] CHELSY 2D+3D DIAGNOSTIC KATHERIN  BILAT (CPT=77066/67694); Future    Encounter for screening mammogram for malignant neoplasm of breast    Extremely dense tissue of both breasts on mammography  -     [facility] CHELSY 2D+3D DIAGNOSTIC KATHERIN  BILAT (CPT=77066/72224); Future  -     US BREAST BILATERAL COMPLETE (CPT=76641-50); Future    Menorrhagia, premenopausal  -     US PELVIS W EV (CPT=76856/44937); Future         Healthcare maintenance:  Pap: done yearly per pt request   Mammogram, ordered  Colonoscopy - 4/2021  DEXA, age 63 (earlier if postmenopausal with personal/fam hx fragility fx, underweight, smoking/excessive ETOH, steroids, RA)        Faviola Rutledge MD  [facility] - OBGYN

## 2024-02-07 NOTE — PROGRESS NOTES
Diagnosis:   Acute LBP      Referring Provider: Baldemar  Date of Evaluation:    1/4/2023    Precautions:  None Next MD visit:   none scheduled  Date of Surgery: n/a   Insurance Primary/Secondary: [employer] OUT OF STATE / N/A     # Auth Visits: 8            Subjective: Pt is over all feeling better.  She notices symptoms with prolonged sitting.  Pain: 3/10    Objective:  Hamstring tightness - R 65 deg, L 55 deg                     L side paraspinal tension L3-5                     Tenderness with PA L4,5 central and unilateral L                     Straightening of lumbar spine, difficulty with curve reversal                     Assessment:  Continued abdominal strengthening today with focus on transverse abdominus contraction.  Pt tolerated activities today without increase in pain. Issued handout for home program.    Goals: (to be met in 8 visits)   Restore full lumbar spine extension 40 deg, rotation 40 deg to enable pt to sleep comfortably at night  Restore lumbar flexion to 30 deg to enable pt to get up from sitting without pain  -ve SLR R/L to 60 deg without LBP  Pt will report no difficulty with changing of positions, bathing or dressing    Plan: Continue HEP for 4 weeks, pt will call if additional therapy visits are needed.  Date: 1/9/2024  TX#: 2/8 Date:1/16/2024                TX#: 3/8 Date: 2/7/2024                TX#: 4/8 Date:                 TX#: 5/ Date:   Tx#: 6/   Prone press up x 10  Prone alt knee flexion R/L x 10 Prone glut max raise, x10 Hook lying LTR x 10  Supine with legs on SB DKTC rolls x 20  Supine with legs on SB bridging x 10      Prone PA mobilizations Gr 2-3 L3,4,5 3 x 30 secs  STM lumbar paraspinals 5 mins Prone PA mobilizations Gr 2-3 L2-4 30 secs  STM lumbar paraspinals 5 mins Supine with hip flexed to 90 deg performed knee ext R/L x 10  Hook lying abd bracing bent leg lift R/L x 10     Hook lying LTR x 10  Abd bracing bent leg lift R/L x 10  Gentle hamstring stretch from 90/90 position  knee ext R/L x 10  Bridging x 10 Hook lying LTR x 10  SB ham curl, x20  SB Bridging x 10 Prone lying press up x 10  PA glides Gr 3 L3,4,5 30 secs x 3  Supine SKTC stretch R/L 3 x 10 sec hold       Prone press up x 10  Quads stretch R/L 3 x 10 sec hold  Abd bracing during transitional movements turning over on tx table and getting up from lying  Standing repeated lumbar spine extension x 10  HEP review and new instructions 2 mins Biofeedback stabilizer:  Tra brace: x10 (40-50 mmHg)  TrA brace, x10, march, R/L  TrA brace with clam, R/L yel    Shuttle - shuttle  DL: x20, 3c Pt education on core strengthening, hamstring flexibility, sitting posture 10 mins  Shuttle DL press 4 bands x 20  Standing ROM lumbar spine   Kneeling sit back stretch with neutral spine x 10     HEP: Prone press up x 10 2-3 x per day, extension in standing, prone alt knee flexion 2 x 10, hook lying bent leg march 2 x 10, bridging 2 x 10, SB knee flexion rolls, SB bridging    Charges: EX 2, MT 1       Total Timed Treatment: There ex 30 min, Manual 15 min   Total Treatment Time: 45 min

## 2024-02-08 LAB — HPV I/H RISK 1 DNA SPEC QL NAA+PROBE: NEGATIVE

## 2024-02-13 LAB
.: NORMAL
.: NORMAL

## 2024-02-22 ENCOUNTER — HOSPITAL ENCOUNTER (OUTPATIENT)
Dept: MAMMOGRAPHY | Facility: HOSPITAL | Age: 44
Discharge: HOME | End: 2024-02-22
Attending: STUDENT IN AN ORGANIZED HEALTH CARE EDUCATION/TRAINING PROGRAM
Payer: COMMERCIAL

## 2024-02-22 DIAGNOSIS — R92.1 BREAST CALCIFICATIONS ON MAMMOGRAM: ICD-10-CM

## 2024-02-22 DIAGNOSIS — R92.343 EXTREMELY DENSE TISSUE OF BOTH BREASTS ON MAMMOGRAPHY: ICD-10-CM

## 2024-02-22 PROCEDURE — 77062 BREAST TOMOSYNTHESIS BI: CPT | Performed by: STUDENT IN AN ORGANIZED HEALTH CARE EDUCATION/TRAINING PROGRAM

## 2024-02-22 PROCEDURE — 77066 DX MAMMO INCL CAD BI: CPT | Performed by: STUDENT IN AN ORGANIZED HEALTH CARE EDUCATION/TRAINING PROGRAM

## 2024-03-05 DIAGNOSIS — Z86.19 HISTORY OF COLD SORES: ICD-10-CM

## 2024-03-05 RX ORDER — VALACYCLOVIR HYDROCHLORIDE 500 MG/1
1000 TABLET, FILM COATED ORAL 2 TIMES DAILY
Qty: 30 TABLET | Refills: 1 | Status: SHIPPED | OUTPATIENT
Start: 2024-03-05

## 2024-03-08 ENCOUNTER — HOSPITAL ENCOUNTER (OUTPATIENT)
Dept: ULTRASOUND IMAGING | Age: 44
Discharge: HOME | End: 2024-03-08
Attending: STUDENT IN AN ORGANIZED HEALTH CARE EDUCATION/TRAINING PROGRAM
Payer: COMMERCIAL

## 2024-03-08 DIAGNOSIS — N92.4 MENORRHAGIA, PREMENOPAUSAL: ICD-10-CM

## 2024-03-08 PROCEDURE — 76856 US EXAM PELVIC COMPLETE: CPT | Performed by: STUDENT IN AN ORGANIZED HEALTH CARE EDUCATION/TRAINING PROGRAM

## 2024-03-08 PROCEDURE — 76830 TRANSVAGINAL US NON-OB: CPT | Performed by: STUDENT IN AN ORGANIZED HEALTH CARE EDUCATION/TRAINING PROGRAM

## 2024-03-13 DIAGNOSIS — N83.209 CYST OF OVARY, UNSPECIFIED LATERALITY: Primary | ICD-10-CM

## 2024-03-14 NOTE — PROGRESS NOTES
Patient aware of US results and recommendations. Small ovarian cyst - most likely from ovulating, but will repeat US in 12 weeks. Already ordered.  Patient verbalized understanding.

## 2024-03-20 ENCOUNTER — TELEPHONE (OUTPATIENT)
Facility: CLINIC | Age: 44
End: 2024-03-20

## 2024-05-16 ENCOUNTER — NURSE ONLY (OUTPATIENT)
Dept: FAMILY MEDICINE CLINIC | Facility: CLINIC | Age: 44
End: 2024-05-16

## 2024-05-16 PROCEDURE — 90746 HEPB VACCINE 3 DOSE ADULT IM: CPT | Performed by: FAMILY MEDICINE

## 2024-05-16 PROCEDURE — 90471 IMMUNIZATION ADMIN: CPT | Performed by: FAMILY MEDICINE

## 2024-05-29 ENCOUNTER — OFFICE VISIT (OUTPATIENT)
Facility: CLINIC | Age: 44
End: 2024-05-29

## 2024-05-29 VITALS — BODY MASS INDEX: 20.31 KG/M2 | WEIGHT: 134 LBS | HEIGHT: 68 IN

## 2024-05-29 DIAGNOSIS — N92.1 MENOMETRORRHAGIA: Primary | ICD-10-CM

## 2024-05-29 DIAGNOSIS — N83.201 RIGHT OVARIAN CYST: ICD-10-CM

## 2024-05-29 DIAGNOSIS — N95.1 PERIMENOPAUSAL: ICD-10-CM

## 2024-05-29 LAB
CONTROL LINE PRESENT WITH A CLEAR BACKGROUND (YES/NO): YES YES/NO
KIT LOT #: NORMAL NUMERIC
PREGNANCY TEST, URINE: NEGATIVE

## 2024-05-29 PROCEDURE — 81025 URINE PREGNANCY TEST: CPT | Performed by: OBSTETRICS & GYNECOLOGY

## 2024-05-29 PROCEDURE — 99214 OFFICE O/P EST MOD 30 MIN: CPT | Performed by: OBSTETRICS & GYNECOLOGY

## 2024-05-29 PROCEDURE — 3008F BODY MASS INDEX DOCD: CPT | Performed by: OBSTETRICS & GYNECOLOGY

## 2024-05-29 RX ORDER — DROSPIRENONE 4 MG/1
1 TABLET, FILM COATED ORAL DAILY
Qty: 84 TABLET | Refills: 3 | Status: CANCELLED | OUTPATIENT
Start: 2024-05-29 | End: 2024-06-26

## 2024-05-29 RX ORDER — MEDROXYPROGESTERONE ACETATE 10 MG/1
TABLET ORAL
Qty: 45 TABLET | Refills: 0 | Status: CANCELLED | OUTPATIENT
Start: 2024-05-29

## 2024-05-29 NOTE — PROGRESS NOTES
[facility]  Obstetrics and Gynecology   History & Physical  Established     Chief complaint:   Chief Complaint   Patient presents with    Gyn Problem     Random heavy cycles with clots         Subjective:     HPI: Brianda Tavarez is a 43 year old  with LMP Patient's last menstrual period was 01/15/2024 (exact date).    Goes by \"Zoila\"     Here for heavier cycles with clots at times.   Chaperone declined      PMHx heterozygous factor V Leiden, chronic constipation (Dx type III dyssynergic defecation), anxiety, tension headache, retroverted uterus    Last OV with me 10/28/21 WWE. Periods were 32 days -> shorted - now every 21-24 days x 5 days of light to normal flow, no cramping. Has always had a few days of spotting right before full low.     24 Dr. Rutledge - CHARIS, irregular periods, last one heavier with clots. Some vaginal itching/irritation after periods. Had vaginitis swab with [facility] physician & was negative. Using boric acid suppositories occasionally. Menses 23-28 days x variable flow from month to month. Can be normal or sometimes \"flooding\" - a lot at once with clot. Happened  2-3 times this past year, during  it actually went through her clothes     Pelvic US 3/8/24 - LMP 3/8/24 - cycle day 1 - normal uterus, endometrium 7 mm, Probable hemorrhagic functional cyst in the right ovary measuring 21 x 18 x 16 mm with low level internal echoes, possible thin internal septation, and no significant internal vascularity. Trace free fluid in cul de sac.     Per Dr. Rutledge \"Small ovarian cyst - most likely from ovulating, but will repeat US in 12 weeks. Ordered\"      As of today, 2024   About 1.5 years ago the flow was a little heavier on the 1st day. Would be flood like on the 1st day.     Job change 1 year ago - was back in [place] for 3 months - great re set.   Now ultrasound for [facility].   Much more flexible. Making life easier for her.     Cycles are 21-24 days now x 5 days x no  cramping.   1st part of the 1st day can be a lot   One month ok & the next heavier.     Spotting about 3 times only with wiping about mid cycle.     The day of the pelvic US in 3/8/24 was heavier flow.   Breasts intermittently very tender - the Serenol was helping with that.     Hot flashes N  Night sweats N  Mood is a little up & down since stopping Serenol - about 1 year ago.   Stress better   Anxiety better  PFPT - helped but did not keep up with exercise. & went to PT for low back pain   Last screening labs 11/2023 - no A1c done. TSH & fasting glucose ok.   Has appt for NPI for the repeat US in June.     Vaginal hyaluronic acid - oily        Patient's last menstrual period was 01/15/2024 (exact date).    GYN Hx  Menses usually regular q 32 days -> 28 days -> 25 days  As of 10/28/21 - cycles short at 21-24 days x 5 days of light to normal flow, no cramping. Has always had a few days of spotting right before full low. No hot flashes. Mom menopause at 47     2/7/24 Menses 23-28 days x variable flow from month to month. Can be normal or sometimes \"flooding\" - a lot at once with clot. Happened maybe2-3 times this past year, during easter it actually went through her clothes     STDs: no   HPV vaccine - no. Declines      H/o colposcopy & cryosurgery of cervix in 2012 & 2013   No history of LEEP/conization.  10/22/2020 Pap & HPV negative   10/28/21 Pap & HPV negative   11/23/22 Pap & HPV negative   2/7/24 Pap & HPV negative     Mammogram  -2/22/24 Diagnostic bilateral - extremely dense. Stable bilateral mammogram with stable left breast magnification views. ROUTINE MAMMOGRAM AND CLINICAL EVALUATION IN 12 MONTHS.     Colonoscopy - 4/2021 negative.     PCP: Jessie Hanley DO    Review of Systems   Constitutional: Negative.    Respiratory: Negative.     Cardiovascular: Negative.    Gastrointestinal:  Positive for constipation. Negative for blood in stool.        Chronic constipation. No recent blood in the stool.   Was  referred to PFPT for pelvic floor dyssynergia - helped but stopped doing exercises so having some issues again.    Genitourinary:  Positive for menstrual problem. Negative for dysuria, frequency, vaginal bleeding, vaginal discharge, pelvic pain, dyspareunia, breast mass, breast pain and hot flashes.        Is sexually active.  No dyspareunia or bleeding.   Contraception: declines. 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        No aura   Psychiatric/Behavioral:  The patient is not nervous/anxious.         High stress, anxiety in the past.  Was feeling burned out.   Didn't like the Lexapro - how she felt on it. Took only half of the prescribed dose for 4 days.      has had a depression for >5 years since his dad     starts to get depressed around  time - lightens up all winter. He had a lightbox. He travels to [place] a lot for his job.     As of 2024 - she &  much better.      OB History:  OB History    Para Term  AB Living   1 1 1 0 0 1   SAB IAB Ectopic Multiple Live Births   0 0 0 0 1     OB History    Para Term  AB Living   1 1 1     1   SAB IAB Ectopic Multiple Live Births           1      # Outcome Date GA Lbr Alcides/2nd Weight Sex Type Anes PTL Lv   1 Term 05/15 39w6d 16:15 / 02:17 8 lb 1.5 oz (3.67 kg) F CS-LTranv EPI N YAMIL      Complications: Fetal tachycardia       Meds:  Current Outpatient Medications on File Prior to Visit   Medication Sig Dispense Refill    valACYclovir 500 MG Oral Tab TAKE 2 TABLETS BY MOUTH TWO TIMES A DAY FOR 1 DAY 30 tablet 1    Sulfacetamide Sodium-Sulfur 10-5 % External Liquid       Ketoconazole 2 % External Shampoo Apply 1 Application topically 3 (three) times a week.       No current facility-administered medications on file prior to visit.       All:  No Known Allergies    PMH:  Past Medical History:    Abnormal ultrasound of breast    Anxiety    ASCUS with positive high risk  HPV cervical    Belching    Bloating    EGD 21 - gastritis, LA Class A esophagitis     Chronic constipation    Underwent EGD & colonoscopy, anorectal manometry-diagnosed with dyssynergic defecation. Prescribed pelvic floor PT    Constipation    Dyssynergic defecation    Anorectal manometry 21 - type III dyssynergic defecation     Elevated fasting glucose    Slightly elevated fasting glucose. Normal A1c at same time.     Flatulence/gas pain/belching    Hemorrhoids    Heterozygous factor V Leiden mutation (HCC)    History of cold sores    gets them right under her nose    Human papilloma virus infection    Infertility, female    Male factor.     Internal hemorrhoids    with rectal bleeding    Low grade squamous intraepithelial lesion (LGSIL) on cervical Pap smear    2013 hpv positive    Menstrual migraine    No aura     Pap smear for cervical cancer screening    Pap & HPV negative.     Tension headache    worsening around 2021. Increased stress & anxiety.        PSH:  Past Surgical History:   Procedure Laterality Date    Appendectomy      Breast surgery Left     papilloma          Colonoscopy  2021    No polyps. Biopsy of duodenum normal. Done for chronic constipation/change in bowel habits    Colposcopy, cervix w/upper adjacent vagina; w/biopsy(s), cervix  2013    Colposcopy, cervix w/upper adjacent vagina; w/biopsy(s), cervix  2012    Cryocautery of cervix  2013    Diagnostic anoscopy  2020    Internal hemorrhoids - Dr. Margaret Pizano     Egd  2021 - impression: gastritis, LA Class A esophagitis. Biopsies negative for inflammation.     Yani localization wire 1 site left (cpt=19281) Left     papilloma age 25 - removed     Tonsillectomy         Social History:  Social History     Socioeconomic History    Marital status:    Tobacco Use    Smoking status: Never    Smokeless tobacco: Never   Vaping Use    Vaping status: Never Used    Substance and Sexual Activity    Alcohol use: No    Drug use: Never    Sexual activity: Yes     Partners: Male   Other Topics Concern    Caffeine Concern No    Exercise No    Seat Belt Yes    Self-Exams Yes     Social Determinants of Health      [facility]    Social Connections    [facility]    Housing Stability      Family History:  Family History   Problem Relation Age of Onset    Hypertension Father         stroke 8/22    Colon Polyps Father     Lipids Father         high cholesterol    Heart Disorder Mother     Pacemaker Mother     Hypertension Mother     Lipids Mother         high cholesterol    No Known Problems Daughter     No Known Problems Maternal Grandmother     No Known Problems Maternal Grandfather     No Known Problems Paternal Grandmother     No Known Problems Paternal Grandfather     Breast Cancer Maternal Cousin Female         dx age 60's    Ovarian Cancer Neg     Colon Cancer Neg        Objective:     Vitals:    05/29/24 1350   Weight: 134 lb (60.8 kg)   Height: 68\"         Body mass index is 20.37 kg/m².    Physical Exam:  Physical Exam   Nursing note and vitals reviewed.   Constitutional: She appears well-developed and well-nourished. No distress.   HENT:   Head: Normocephalic and atraumatic.   Eyes: Conjunctivae and EOM are normal.   Pulmonary/Chest: Right breast exhibits no mass. Left breast exhibits no mass.   Neurological: She is alert.   Skin: Skin is warm.   Psychiatric: She has a normal mood and affect. Her speech is normal and behavior is normal. Judgment and thought content normal.        Labs:  Component      Latest Ref Rng 11/10/2023   WBC      4.0 - 11.0 x10(3) uL 5.0    RBC      3.80 - 5.30 x10(6)uL 4.25    Hemoglobin      12.0 - 16.0 g/dL 13.1    Hematocrit      35.0 - 48.0 % 39.7    Platelet Count      150.0 - 450.0 10(3)uL 292.0    MCV      80.0 -  100.0 fL 93.4    MCH      26.0 - 34.0 pg 30.8    MCHC      31.0 - 37.0 g/dL 33.0    RDW      % 13.3    Prelim Neutrophil Abs      1.50 - 7.70 x10 (3) uL 3.06    Neutrophils Absolute      1.50 - 7.70 x10(3) uL 3.06    Lymphocytes Absolute      1.00 - 4.00 x10(3) uL 1.48    Monocytes Absolute      0.10 - 1.00 x10(3) uL 0.34    Eosinophils Absolute      0.00 - 0.70 x10(3) uL 0.06    Basophils Absolute      0.00 - 0.20 x10(3) uL 0.03    Immature Granulocyte Absolute      0.00 - 1.00 x10(3) uL 0.01    Neutrophils %      % 61.5    Lymphocytes %      % 29.7    Monocytes %      % 6.8    Eosinophils %      % 1.2    Basophils %      % 0.6    Immature Granulocyte %      % 0.2    Glucose      70 - 99 mg/dL 95    Sodium      136 - 145 mmol/L 139    Potassium      3.5 - 5.1 mmol/L 3.9    Chloride      98 - 112 mmol/L 108    Carbon Dioxide, Total      21.0 - 32.0 mmol/L 26.0    ANION GAP      0 - 18 mmol/L 5    BUN      9 - 23 mg/dL 14    CREATININE      0.55 - 1.02 mg/dL 0.84    CALCIUM      8.5 - 10.1 mg/dL 8.6    CALCULATED OSMOLALITY      275 - 295 mOsm/kg 288    EGFR      >=60 mL/min/1.73m2 87    AST (SGOT)      15 - 37 U/L 16    ALT (SGPT)      13 - 56 U/L 19    ALKALINE PHOSPHATASE      37 - 98 U/L 39    Total Bilirubin      0.1 - 2.0 mg/dL 0.6    PROTEIN, TOTAL      6.4 - 8.2 g/dL 7.5    Albumin      3.4 - 5.0 g/dL 4.1    Globulin      2.8 - 4.4 g/dL 3.4    A/G Ratio      1.0 - 2.0  1.2    Patient Fasting for CMP? Yes    Cholesterol, Total      <200 mg/dL 156    HDL Cholesterol      40 - 59 mg/dL 63 (H)    Triglycerides      30 - 149 mg/dL 31    LDL Cholesterol Calc      <100 mg/dL 85    VLDL      0 - 30 mg/dL 5    NON-HDL CHOLESTEROL      <130 mg/dL 93    Patient Fasting for Lipid? Yes    T4,Free (Direct)      0.8 - 1.7 ng/dL 0.9    TSH      0.358 - 3.740 mIU/mL 1.130       Legend:  (H) High    Imaging:  PROCEDURE:  US PELVIS W EV (CPT=76856,98319)     COMPARISON:  None.     INDICATIONS:  N92.4 Menorrhagia,  premenopausal     TECHNIQUE:  Pelvic ultrasound using transabdominal and endovaginal technique.  Transvaginal ultrasound was used to better evaluate adnexal and endometrial detail.     FINDINGS:                UTERUS:  8.7 x 4.7 x 5.1 cm    Endometrium Thickness:  0.7 cm    The uterus appears normal in size, shape, and echogenicity.  Nabothian cysts noted.  RIGHT OVARY:  3.5 x 2.9 x 3.6 cm    Probable hemorrhagic functional cyst in the right ovary measuring 21 x 18 x 16 mm with low level internal echoes, possible thin internal septation, and no significant internal vascularity.  LEFT OVARY:  2.6 x 1.4 x 1.8 cm    The left ovary appears normal in size, shape, and echogenicity. No significant masses are identified.  CUL-DE-SAC:  Trace free fluid  OTHER:  Negative.                        Impression   CONCLUSION:       1. 21 mm probable hemorrhagic functional cyst in the right ovary.  Follow-up pelvic ultrasound in 2-3 menstrual cycles is suggested to assess for resolution/stability.     2. Unremarkable uterus and left ovary.     3. Trace free fluid in the pelvis may be physiologic.       LOCATION:  [facility]        Dictated by (CST): Ghassan Galvan MD on 3/08/2024 at 3:26 PM      Finalized by (CST): Ghassan Galvan MD on 3/08/2024 at 3:28 PM         Assessment:     Brianda Tavarez is a 43 year old  female menometrorrhagia     Diagnoses and all orders for this visit:    Menometrorrhagia  -     Urine Preg Test [[id number]]    Perimenopausal    Right ovarian cyst           Plan:     Menometrorrhagia  -variable cycle length, some shorter, with variable flow  -consistent with ovulatory dysfunction, probable perimenopause   -pelvic US 3/8/24 with apparent hemorrhagic ovarian cyst 2.1 cm right ovary. Repeat pelvic US 12 wk per Dr. Rutledge   -No estrogen due to Factor V Leiden heterozygosity  -Discussed progestin only therapy to help with bleeding - Slynd, norethindrone acetate, Nexplanon, LNG-IUD    -has repeat pelvic US  ordered per Dr. Rutledge    -discussed 3/8 US looked to me like possible adeno, endometrium not very distinct - possibility of underlying endometrial polyp, & the ovarian cyst looked relatively simple to me - could have been an anovulatory bleed.     She is going to re-try Serenol supplement again for breasts/mood    2/7/24 Pap & HPV negative   -prefers yearly. Up to date.     Dense breasts,   -2/7/24 breast exam Dr. Rutledge  -2/22/24 Diagnostic bilateral mammogram - extremely dense. Stable bilateral mammogram with stable left breast magnification views. ROUTINE MAMMOGRAM AND CLINICAL EVALUATION IN 12 MONTHS.     Colonoscopy - 4/2021 negative.  HPV vaccine declines.     Contraception - Declines. 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     Chronic constipation apparently due to pelvic floor dysfunction   -s/p anorectal manometry 4/22/21 -> Dx type III dyssynergic defecation   -s/p colonoscopy 4/16/21 neg    -pelvic floor PT referral helped    RTC pending repeat US for WWE after 2/7/25     Batool Welch MD  [facility] - OBGYN

## 2024-05-29 NOTE — PATIENT INSTRUCTIONS
Coconut oil - unrefined     Lubricants  Aloe Cadabra  Good Clean Love  Pre-Seed (targeted for those attempting to conceive)   Restore  *Lubricants that are hypo-osmolar or iso-osmolar are preferable to hyper-osmolar formulations.     Vaginal moisturizers  -Luvena, Replens, Rephresh     Natural products from [employer]  Revaree - vaginal dryness (hyaluronic acid)   Serenol - irritability, mood swings from PMS  Relizen - menopausal hot flashes  Ristela - sexual satisfaction

## 2024-06-10 ENCOUNTER — TELEPHONE (OUTPATIENT)
Facility: CLINIC | Age: 44
End: 2024-06-10

## 2024-06-17 ENCOUNTER — TELEPHONE (OUTPATIENT)
Dept: FAMILY MEDICINE CLINIC | Facility: CLINIC | Age: 44
End: 2024-06-17

## 2024-06-17 NOTE — TELEPHONE ENCOUNTER
Pt states since Thursday last week, she has some discomfort under her rib cage/near abd.  No pain, feels it when laying down, or taking deep  breath.  Increase burping, tried pepcid yesterday with no relief.  No nausea/vomiting. No available appt.  Advised IC. Pt agrees

## 2024-08-13 NOTE — H&P
[facility]  Obstetrics and Gynecology   History & Physical  Established     Chief complaint:   Chief Complaint   Patient presents with    Gyn Problem     -Patient reports heavy periods with clots  -Patient would like to discuss US results from 24     Subjective:     HPI: Brianda Tavarez is a 43 year old  with LMP Patient's last menstrual period was 2024 (exact date).    Goes by \"[employer]\" - ultrasonographer for [employer]    Here to discuss follow up pelvic US done for incidental right ovarian cyst noted on pelvic US done for menometrorrhagia.     Chaperone declined      PMHx heterozygous factor V Leiden, chronic constipation (Dx type III dyssynergic defecation), anxiety, tension headache, retroverted uterus    24 Menses 23-28 days x variable flow from month to month. Can be normal or sometimes \"flooding\" - a lot at once with clot. Happened maybe2-3 times this past year, during  it actually went through her clothes     Pelvic US 3/8/24 - LMP 3/8/24 - cycle day 1 - normal uterus, endometrium 7 mm, Probable hemorrhagic functional cyst in the right ovary measuring 21 x 18 x 16 mm with low level internal echoes, possible thin internal septation, and no significant internal vascularity. Trace free fluid in cul de sac. Per Dr. Rutledge \"Small ovarian cyst - most likely from ovulating, but will repeat US in 12 weeks. Ordered\"     2024 - Dr. Welch- Here for heavier cycles with clots at times. Cycles are 21-24 days now x 5 days x no cramping. 1st part of the 1st day can be a lot. One month ok & the next heavier. Spotting about 3 times only with wiping about mid cycle. The day of the pelvic US in 3/8/24 was heavier flow. Breasts intermittently very tender - the Serenol was helping with that. No hot flashes or sweats. Stopped Serenol about 1 year prior. Mood a little more up & down but overall stress & anxiety are better. Images of pelvic US from 3/8/24 reviewed. Uterus mild to moderate  adenomyosis, endometrium not very distinct. Ovarian cyst relatively simple with some debris noted. Most consistent with a hemorrhagic ovarian cyst. Possible underlying endometrial polyp. Discussed progestin only therapy to help with bleeding. Declines. Patient prefers to see how the repeat pelvic US looks & going to re-try Serenol supplement for mood & breast tenderness.     6/5/24 Pelvic US at [facility] (no imaging available) - cycle day #15   -follow up ovarian cyst noted on 3/8/24 pelvic US  -LMP 5/22/24   -uterus retroverted 4.6 x 5.1 x 8.8 cm  -endometrial stripe 1.3 cm  -no fibroids  -Hypoechoic right ovarian cyst 2.2 x 1.9 x 2.0 cm with ill defined internal echoes & mildly thickened wall, though no internal vascularity or significant peripheral vascularity.   -ovaries normal in size  -small free fluid  Impression:   Right ovarian cyst 2.2 cm likely evolving hemorrhagic cyst or corpus luteum cyst  Uterine size and endometrial stripe normal  Small pelvic free fluid, likely physiological.     As of today, 8/14/24   Pelvic US 3/8/24 on cycle day 1 - probable hemorrhagic functional cyst 2.1 cm right ovary  Pelvic US 6/5/24 on cycle day 15 - probable evolving hemorrhagic cyst or corpus luteum cyst 2.2 cm right ovary   Periods - 23 to 24 days.     1st day just dark brown. When she wakes up after the night after about 1 hour will pass a large blood clot & then quarter sized clots & heavier for 3-4 hours & then little ones & then tapers off to almost nothing. Lasting 3-4 days total. Minimal cramping. Significant headaches at times. Last week had HA for almost 1 week.     Pelvic pain N  Mood - irritability.   Breasts very tender  Had restarted Serenol & took for about 2-3 weeks - didn't notice improvement yet & then stopped it because she had some stomach aching.   Hair thinning x 1 year - gradually    Patient's last menstrual period was 08/06/2024 (exact date).    PCP: Jessie Hanley, DO    Review of  Systems   Respiratory: Negative.     Cardiovascular: Negative.    Gastrointestinal:  Positive for constipation. Negative for blood in stool.        Chronic constipation. No recent blood in the stool.   Was referred to PFPT for pelvic floor dyssynergia - helped but stopped doing exercises so having some issues again.    Genitourinary:  Positive for menstrual problem and breast pain. Negative for dysuria, frequency, vaginal bleeding, vaginal discharge, pelvic pain, dyspareunia, breast mass and hot flashes.        Hot flashes N  Night sweats N    Is sexually active.  No dyspareunia or bleeding.   Vaginal hyaluronic acid - did not like it - felt oil-like in texture    Breasts are very tender again like prior to taking Serenol in the past.    Skin:         Hair thinning over about 1 year (around 2023)   Neurological:  Positive for headaches.        No aura   Psychiatric/Behavioral:  The patient is not nervous/anxious.         High stress, anxiety in the past. Was feeling burned out.   Didn't like the Lexapro - how she felt on it. Took only half of the prescribed dose for 4 days.      with depression for >5 years since his dad     starts to get depressed around  time - lasts all winter until it lightens up. He had a lightbox. He travels to [place] a lot for his job.     As of 2024 - she &  much better.   As of 2024 c/o irritability, lack of patience.      GYN Hx  Menses usually regular q 32 days -> 28 days -> 25 days    10/28/21 - cycles short at 21-24 days x 5 days of light to normal flow, no cramping. Has always had a few days of spotting right before full low. No cramping. No hot flashes. Mom menopause at 47     24 Menses 23-28 days x variable flow from month to month. Can be normal or sometimes \"flooding\" - a lot at once with clot. Happened maybe2-3 times this past year, during  it actually went through her clothes     Pelvic US 3/8/24 - LMP 3/8/24 - cycle day 1  - normal uterus, endometrium 7 mm, Probable hemorrhagic functional cyst in the right ovary measuring 21 x 18 x 16 mm with low level internal echoes, possible thin internal septation, and no significant internal vascularity. Trace free fluid in cul de sac. Per Dr. Rutledge \"Small ovarian cyst - most likely from ovulating, but will repeat US in 12 weeks. Ordered\"     Contraception: declines. 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 Declines      H/o colposcopy & cryosurgery of cervix in  &    No history of LEEP/conization.  10/22/2020 Pap & HPV negative   10/28/21 Pap & HPV negative   22 Pap & HPV negative   24 Pap & HPV negative     Mammogram  -24 Diagnostic bilateral - extremely dense. Stable bilateral mammogram with stable left breast magnification views. ROUTINE MAMMOGRAM AND CLINICAL EVALUATION IN 12 MONTHS.     Colonoscopy - 2021 negative.     OB History:  OB History    Para Term  AB Living   1 1 1 0 0 1   SAB IAB Ectopic Multiple Live Births   0 0 0 0 1     OB History    Para Term  AB Living   1 1 1     1   SAB IAB Ectopic Multiple Live Births           1      # Outcome Date GA Lbr Alcides/2nd Weight Sex Type Anes PTL Lv   1 Term 05/11/15 39w6d 16:15 / 02:17 8 lb 1.5 oz (3.67 kg) F CS-LTranv EPI N YAMIL      Complications: Fetal tachycardia       Meds:  Current Outpatient Medications on File Prior to Visit   Medication Sig Dispense Refill    valACYclovir 500 MG Oral Tab TAKE 2 TABLETS BY MOUTH TWO TIMES A DAY FOR 1 DAY 30 tablet 1    Sulfacetamide Sodium-Sulfur 10-5 % External Liquid  (Patient not taking: Reported on 2024)      Ketoconazole 2 % External Shampoo Apply 1 Application topically 3 (three) times a week. (Patient not taking: Reported on 2024)       No current facility-administered medications on file prior to visit.       All:  No Known Allergies    PMH:  Past Medical History:    Abnormal ultrasound of breast    Anxiety     ASCUS with positive high risk HPV cervical    Belching    Bloating    EGD 21 - gastritis, LA Class A esophagitis     Chronic constipation    Underwent EGD & colonoscopy, anorectal manometry-diagnosed with dyssynergic defecation. Prescribed pelvic floor PT    Constipation    Dyssynergic defecation    Anorectal manometry 21 - type III dyssynergic defecation     Elevated fasting glucose    Slightly elevated fasting glucose. Normal A1c at same time.     Flatulence/gas pain/belching    H/O pelvic ultrasound    Mildly complex hypoechoic cyst RT ovary 2.2cm w/ill-defined internal echoes &mildly thickened wall, likely evolving hemorrhagic cyst or corpus luteum cyst. LT ovary: WNL. Uterine size & endometrial stripe thickness WNL for premenopausal status. No evidence of uterine fibroids. Multiple subcentimeter nabothian cysts in the cervix. Small amount of pelvic free fluid, physiologic premenopausal female.    Hemorrhoids    Heterozygous factor V Leiden mutation (HCC)    History of cold sores    gets them right under her nose    Human papilloma virus infection    Infertility, female    Male factor.     Internal hemorrhoids    with rectal bleeding    Low grade squamous intraepithelial lesion (LGSIL) on cervical Pap smear    2013 hpv positive    Menstrual migraine    No aura     Pap smear for cervical cancer screening    Pap & HPV negative.     Tension headache    worsening around 2021. Increased stress & anxiety.        PSH:  Past Surgical History:   Procedure Laterality Date    Appendectomy      Breast surgery Left     papilloma          Colonoscopy  2021    No polyps. Biopsy of duodenum normal. Done for chronic constipation/change in bowel habits    Colposcopy, cervix w/upper adjacent vagina; w/biopsy(s), cervix  2013    Colposcopy, cervix w/upper adjacent vagina; w/biopsy(s), cervix  2012    Cryocautery of cervix  2013    Diagnostic anoscopy  2020    Internal  hemorrhoids - Dr. Margaret Pizano     Egd  04/16/2021 4/16/21 - impression: gastritis, LA Class A esophagitis. Biopsies negative for inflammation.     Yani localization wire 1 site left (cpt=[id number]) Left     papilloma age 25 - removed     Tonsillectomy         Social History:  Social History     Socioeconomic History    Marital status:    Tobacco Use    Smoking status: Never    Smokeless tobacco: Never   Vaping Use    Vaping status: Never Used   Substance and Sexual Activity    Alcohol use: No    Drug use: Never    Sexual activity: Yes     Partners: Male   Other Topics Concern    Caffeine Concern No    Stress Concern No    Weight Concern No    Special Diet No    Exercise No    Seat Belt Yes    Self-Exams Yes     Social Determinants of Health      [facility]    Social Connections    [facility] from [facility]    Housing Stability      Family History:  Family History   Problem Relation Age of Onset    Hypertension Father         stroke 8/22    Colon Polyps Father     Lipids Father         high cholesterol    Heart Disorder Mother     Pacemaker Mother     Hypertension Mother     Lipids Mother         high cholesterol    No Known Problems Daughter     No Known Problems Maternal Grandmother     No Known Problems Maternal Grandfather     No Known Problems Paternal Grandmother     No Known Problems Paternal Grandfather     Breast Cancer Maternal Cousin Female         dx age 60's    Ovarian Cancer Neg     Colon Cancer Neg        Objective:     Vitals:    08/14/24 0803   BP: 126/70   Pulse: 71   Weight: 135 lb 9.6 oz (61.5 kg)   Height: 67\"           Body mass index is 21.24 kg/m².    Physical Exam:  Physical Exam   Nursing note and vitals reviewed.   Constitutional: She appears well-developed and well-nourished. No distress.   HENT:   Head: Normocephalic and atraumatic.   Eyes: Conjunctivae and EOM  are normal.   Pulmonary/Chest: Right breast exhibits no mass. Left breast exhibits no mass.   Genitourinary:    Genitourinary Comments: VULVA: normal appearing vulva with no masses, tenderness or lesions  URETHRA: unremarkable.    PERINEUM: intact   VAGINA: normal appearing vagina with normal color and discharge, no lesions   CERVIX: normal appearing cervix without discharge or lesions  UTERUS: retroverted, non tender, 7-8 wk sized  ADNEXA: normal adnexa in size, nontender and no masses  PELVIC FLOOR: moderate hypertonicity, mild tenderness       Neurological: She is alert.   Psychiatric: She has a normal mood and affect. Her speech is normal and behavior is normal. Judgment and thought content normal.        Labs:  Component      Latest Ref Rn 11/10/2023   WBC      4.0 - 11.0 x10(3) uL 5.0    RBC      3.80 - 5.30 x10(6)uL 4.25    Hemoglobin      12.0 - 16.0 g/dL 13.1    Hematocrit      35.0 - 48.0 % 39.7    Platelet Count      150.0 - 450.0 10(3)uL 292.0    MCV      80.0 - 100.0 fL 93.4    MCH      26.0 - 34.0 pg 30.8    MCHC      31.0 - 37.0 g/dL 33.0    RDW      % 13.3    Prelim Neutrophil Abs      1.50 - 7.70 x10 (3) uL 3.06    Neutrophils Absolute      1.50 - 7.70 x10(3) uL 3.06    Lymphocytes Absolute      1.00 - 4.00 x10(3) uL 1.48    Monocytes Absolute      0.10 - 1.00 x10(3) uL 0.34    Eosinophils Absolute      0.00 - 0.70 x10(3) uL 0.06    Basophils Absolute      0.00 - 0.20 x10(3) uL 0.03    Immature Granulocyte Absolute      0.00 - 1.00 x10(3) uL 0.01    Neutrophils %      % 61.5    Lymphocytes %      % 29.7    Monocytes %      % 6.8    Eosinophils %      % 1.2    Basophils %      % 0.6    Immature Granulocyte %      % 0.2    Glucose      70 - 99 mg/dL 95    Sodium      136 - 145 mmol/L 139    Potassium      3.5 - 5.1 mmol/L 3.9    Chloride      98 - 112 mmol/L 108    Carbon Dioxide, Total      21.0 - 32.0 mmol/L 26.0    ANION GAP      0 - 18 mmol/L 5    BUN      9 - 23 mg/dL 14    CREATININE      0.55  - 1.02 mg/dL 0.84    CALCIUM      8.5 - 10.1 mg/dL 8.6    CALCULATED OSMOLALITY      275 - 295 mOsm/kg 288    EGFR      >=60 mL/min/1.73m2 87    AST (SGOT)      15 - 37 U/L 16    ALT (SGPT)      13 - 56 U/L 19    ALKALINE PHOSPHATASE      37 - 98 U/L 39    Total Bilirubin      0.1 - 2.0 mg/dL 0.6    PROTEIN, TOTAL      6.4 - 8.2 g/dL 7.5    Albumin      3.4 - 5.0 g/dL 4.1    Globulin      2.8 - 4.4 g/dL 3.4    A/G Ratio      1.0 - 2.0  1.2    Patient Fasting for CMP? Yes    Cholesterol, Total      <200 mg/dL 156    HDL Cholesterol      40 - 59 mg/dL 63 (H)    Triglycerides      30 - 149 mg/dL 31    LDL Cholesterol Calc      <100 mg/dL 85    VLDL      0 - 30 mg/dL 5    NON-HDL CHOLESTEROL      <130 mg/dL 93    Patient Fasting for Lipid? Yes    T4,Free (Direct)      0.8 - 1.7 ng/dL 0.9    TSH      0.358 - 3.740 mIU/mL 1.130       Legend:  (H) High    Imaging:  PROCEDURE:  US PELVIS W EV (CPT=76856,64997)     COMPARISON:  None.     INDICATIONS:  N92.4 Menorrhagia, premenopausal     TECHNIQUE:  Pelvic ultrasound using transabdominal and endovaginal technique.  Transvaginal ultrasound was used to better evaluate adnexal and endometrial detail.     FINDINGS:                UTERUS:  8.7 x 4.7 x 5.1 cm    Endometrium Thickness:  0.7 cm    The uterus appears normal in size, shape, and echogenicity.  Nabothian cysts noted.  RIGHT OVARY:  3.5 x 2.9 x 3.6 cm    Probable hemorrhagic functional cyst in the right ovary measuring 21 x 18 x 16 mm with low level internal echoes, possible thin internal septation, and no significant internal vascularity.  LEFT OVARY:  2.6 x 1.4 x 1.8 cm    The left ovary appears normal in size, shape, and echogenicity. No significant masses are identified.  CUL-DE-SAC:  Trace free fluid  OTHER:  Negative.                        Impression   CONCLUSION:       1. 21 mm probable hemorrhagic functional cyst in the right ovary.  Follow-up pelvic ultrasound in 2-3 menstrual cycles is suggested to assess for  resolution/stability.     2. Unremarkable uterus and left ovary.     3. Trace free fluid in the pelvis may be physiologic.       LOCATION:  [facility]        Dictated by (CST): Ghassan Galvan MD on 3/08/2024 at 3:26 PM      Finalized by (CST): Ghassan Galvan MD on 3/08/2024 at 3:28 PM         Assessment:     Brianda Tavarez is a 43 year old  female shortening cycles & heavier but short lived bleeds, small right ovarian cyst.  Suspect ovulatory dysfunction/perimenopausal      Diagnoses and all orders for this visit:    Excessive bleeding in premenopausal period  -     BIOPSY OF UTERUS LINING (50714)    Right ovarian cyst  -     Specimen to pathology , tissue    Perimenopausal  -     Specimen to pathology , tissue  -     BIOPSY OF UTERUS LINING (73970)    History of vitamin D deficiency  -     VITAMIN D, 25-HYDROXY [42780][Q]    Thinning hair  -     TSH W Reflex To Free T4  -     Ferritin  -     Vitamin B12    Irritability  -     TSH W Reflex To Free T4  -     Ferritin  -     Vitamin B12             Plan:     Menometrorrhagia  -variable cycle length, some shorter, with variable flow  -consistent with ovulatory dysfunction, probable perimenopause   -pelvic US 3/8/24 with apparent hemorrhagic ovarian cyst 2.1 cm right ovary. Repeat pelvic US 12 wk per Dr. Rutledge   -discussed 3/8/24 US looked to me like possible adeno, endometrium not very distinct - possibility of underlying endometrial polyp, & the ovarian cyst looked relatively simple to me - could have been an anovulatory bleed.   -No estrogen due to Factor V Leiden heterozygosity  -Discussed progestin only therapy to help with bleeding - Slynd, norethindrone acetate, Nexplanon, LNG-IUD    -repeat pelvic US 24 at outside facility (no images available) 2.2 cm possible hemorrhagic corpus luteum right ovary   -Overall clinical picture consistent with ovulatory dysfunction/perimenopause given the shortening of the cycles.   -Not sure if persistent or recurrent  cyst on the right ovary.   -Ddx hemorrhagic ovarian cyst/functional cyst, endometrioma, benign ovarian neoplasm, less likely malignant ovarian neoplasm.   -AUB - Endometrial sampling advised - options reviewed   EMB in office (won't remove polyp if present) - pt prefers to do this - 8/14/2024   Saline infusion hysterosonogram with EMB in office (won't remove polyp if present)  OR for HSC w/ D&C poss polypectomy  -Ovarian cyst - could pursue ovarian suppression with progestin only method to see if resolves, or could observe & re-image. She is not too worried   -she reports she did not do well in past on OCP.   -EMB done 8/14/2024 - sounded 7 cm.     Hair thinning x 1 year  -had normal TSH in 11/2023  -h/o vit D deficiency. Doesn't take any vitamins.   -will check TSH, vit D, vit B12, ferritin     Breast tenderness & mood   -pt to re-try Serenol supplement again for breasts/mood - she may restart & give it another try      2/7/24 Pap & HPV negative   -prefers yearly. Up to date.     Dense breasts,   -2/7/24 breast exam Dr. Rutledge  -2/22/24 Diagnostic bilateral mammogram - extremely dense. Stable bilateral mammogram with stable left breast magnification views. ROUTINE MAMMOGRAM AND CLINICAL EVALUATION IN 12 MONTHS.     Colonoscopy - 4/2021 negative.  HPV vaccine declines.     Contraception - Declines. 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     Chronic constipation apparently due to pelvic floor dysfunction   -s/p anorectal manometry 4/22/21 -> Dx type III dyssynergic defecation   -s/p colonoscopy 4/16/21 neg    -pelvic floor PT helped    RTC due for WWE after 2/7/25     Batool Welch MD  [facility] - OBGYN

## 2024-08-14 ENCOUNTER — OFFICE VISIT (OUTPATIENT)
Facility: CLINIC | Age: 44
End: 2024-08-14
Payer: COMMERCIAL

## 2024-08-14 VITALS
DIASTOLIC BLOOD PRESSURE: 70 MMHG | HEIGHT: 67 IN | SYSTOLIC BLOOD PRESSURE: 126 MMHG | WEIGHT: 135.63 LBS | BODY MASS INDEX: 21.29 KG/M2 | HEART RATE: 71 BPM

## 2024-08-14 DIAGNOSIS — R45.4 IRRITABILITY: ICD-10-CM

## 2024-08-14 DIAGNOSIS — L65.9 THINNING HAIR: ICD-10-CM

## 2024-08-14 DIAGNOSIS — N92.4 EXCESSIVE BLEEDING IN PREMENOPAUSAL PERIOD: Primary | ICD-10-CM

## 2024-08-14 DIAGNOSIS — N83.201 RIGHT OVARIAN CYST: ICD-10-CM

## 2024-08-14 DIAGNOSIS — Z86.39 HISTORY OF VITAMIN D DEFICIENCY: ICD-10-CM

## 2024-08-14 DIAGNOSIS — N95.1 PERIMENOPAUSAL: ICD-10-CM

## 2024-08-14 PROCEDURE — 58100 BIOPSY OF UTERUS LINING: CPT | Performed by: OBSTETRICS & GYNECOLOGY

## 2024-08-14 PROCEDURE — 81025 URINE PREGNANCY TEST: CPT | Performed by: OBSTETRICS & GYNECOLOGY

## 2024-08-14 PROCEDURE — 99213 OFFICE O/P EST LOW 20 MIN: CPT | Performed by: OBSTETRICS & GYNECOLOGY

## 2024-08-14 PROCEDURE — 3074F SYST BP LT 130 MM HG: CPT | Performed by: OBSTETRICS & GYNECOLOGY

## 2024-08-14 PROCEDURE — 3078F DIAST BP <80 MM HG: CPT | Performed by: OBSTETRICS & GYNECOLOGY

## 2024-08-14 PROCEDURE — 88305 TISSUE EXAM BY PATHOLOGIST: CPT | Performed by: OBSTETRICS & GYNECOLOGY

## 2024-08-14 PROCEDURE — 3008F BODY MASS INDEX DOCD: CPT | Performed by: OBSTETRICS & GYNECOLOGY

## 2024-08-14 NOTE — PATIENT INSTRUCTIONS
Magnesium supplementation  Different choices based on goals:    Oral route:  Magnesium oxide 250-500 mg nightly - better choice if constipated, cheaper, less well absorbed  Magnesium glycinate 250-500 mg nightly - harder to find, more expensive, better absorbed, fewer GI side effects  Alternatives: magnesium chloride, magnesium sulfate.   Magnesium threonate 2000 mg nightly - NOT for use in pregnancy. Penetrates blood brain barrier best. Least GI effects, more expensive.     Do NOT recommend magnesium citrate - this is a laxative.   Do not recommend taking at same time as prenatal vitamin (calcium in prenatal vitamin competes with magnesium for absorption)    Non-oral route:   Epsom salt soaks (baths or foot baths - you can absorb magnesium through the skin)   Magnesium lotions/body sprays.       Perimenopause/Menopause Symptoms   Per [facility]   Patient Instructions   Bonafide Relizen for relief of hot flashes and night sweats - please give it about 3mos for maximum benefit.  -OR-             Estrovera:       Estrovera by Chat[employer] - Estrovera’s key ingredient, VSc650, has been used safely and effectively for over 20 years and is supported by clinical studies. Estrovera provides relief for multiple menopausal symptoms, including hot flashes, sleep disturbances, mood swings, irritability, anxiety, and sexual problems.     Dose:  Take one tablet with food and a glass of water once daily at the same time of day       2. Magnesium Citrate or CALM (can be found at [employer], Fruitful Yield, [employer], etc) - start with 1/4 teaspoon in warm water each night and then increase by 1/4 teaspoon each night until you find your therapeutic dose (when the bowels are moving correctly).         3. Metabolic Detox Complete Natural Chocolate by Metabolic Maintenance    Metabolic Detox Complete, is an excellent supplement for comprehensive detoxification programs and elimination diets. It contains a hypoallergenic  blend of pea, rice, and hemp, non-GMO proteins, beneficial medium chain triglycerides, and omega-3 fatty acids. Metabolic Detox® Complete also includes a balanced combination of vitamins, minerals, essential amino acids, and other beneficial nutrient cofactors to simplify and support both Phase I and Phase II detoxification. With 20 grams of protein per serving, it makes an excellent meal replacement or a daily protein shake.*     Provides 20 grams of clean protein for sustained energy*  Nourishes Phase I and II detoxification pathways*  Enhanced with liver protective silymarin and green tea plant extracts        Suggested Use:  Blend, shake or stir 2 scoops (50 grams) into 8-12 ounces of water - or preferably almond milk, 1-2 times per day.      4. Warm lemon water every morning     5. Jayna-3 by Pure Encapsulations for libido support:         - Jayna, cultivated in the [place], has long been considered the ginseng of the [place], supporting energy, stamina and vitality. For centuries, it has been associated with supporting healthy libido and sexual performance. Studies indicate that this effect appears independent of any influence on testosterone or other hormones, further supporting the safety of jayna.1,2 In addition to traditional use, several recent trials involving male subjects indicate its potential for supporting healthy sexual desire and performance.3 Jayna has also been associated with promoting healthy female sexual interest and healthy reproductive system function.     Dose: As a dietary supplement, take 1 capsule, 1-2 times daily, with or between meals      Where to Purchase: [email or web address]  This is our [facility] online dispensary for vitamins and supplements where you receive a 10% discount off of supplement prices! Select the \"Log In\" and then use your email address to complete creating your personal account. Please call [employer] at [phone], if you  need direct help.      The products and items listed below (the “Products”)  and their claims have not been evaluated by the Food and Drug Administration. Dietary products are not intended to treat, prevent, mitigate or cure disease. Ultimately, you must draw your own conclusion as to the efficacy of the Product and immediately stop use of the Products if a negative reaction should arise.  You should always consult a licensed health care professional before starting any supplement, dietary, or exercise program, especially if you are pregnant or have any pre-existing injuries or medical conditions. The patient agrees that the [facility] and its affiliates and its [facility] (“[facility]”) are not liable for the patients use of the Products. [facility] makes no representations or warranties of any kind, expressed or implied, as to the Products, including, but not limited to its efficacy, benefits or outcomes.  Patient agrees to contact his/her healthcare professional and stop use of Products should any reactions arise      2/2023

## 2024-08-14 NOTE — PROCEDURES
2024    Procedure: Endometrial biopsy    Pre-op Dx: Menorrhagia    Post-op Dx: Same    Indication: 43 year old  female with shortening cycles 23-24 days with some clots, though bleeding only lasts a few days. No pain. Suspect perimenopausal ovulatory dysfunction. Endometrial sampling recommended. Patient's last menstrual period was 2024 (exact date).     Risks, benefits, alternatives discussed.     Bimanual with retroverted uterus about 7-8 wk size. No adnexal masses   Speculum placed.   Cervix swabbed with betadine x 3   Tenaculum applied to posterior lip of cervix  Cervical canal not stenotic  Pipelle inserted without difficulty to 7 cm  Multiple passes made with small to moderate tissue obtained.   Pipelle & tenaculum removed  Hemostatic tenaculum sites  Speculum removed.   Tolerated well  EBL minimal  Specimen: endometrial biopsy    Batool Welch MD

## 2024-08-19 ENCOUNTER — TELEPHONE (OUTPATIENT)
Facility: CLINIC | Age: 44
End: 2024-08-19

## 2024-08-19 DIAGNOSIS — N84.0 ENDOMETRIAL POLYP: Primary | ICD-10-CM

## 2024-08-19 NOTE — PROGRESS NOTES
Patient aware of pathology results and recommendations.Pathologist did note a small endometrial polyp. Only problem with this is the pipelle instrument used to perform the biopsy is very small, so I can't be sure that the entire polyp was removed. It was likely only a 3 mm fragment of polyp tissue. Overall benign. Would recommend OR for hysteroscopy, D&C, polypectomy. Patient verbalized understanding.

## 2024-08-22 PROBLEM — E53.8 VITAMIN B12 DEFICIENCY: Status: ACTIVE | Noted: 2024-08-22

## 2024-08-22 PROBLEM — E61.1 IRON DEFICIENCY: Status: ACTIVE | Noted: 2024-08-22

## 2024-08-22 LAB
FERRITIN: 10 NG/ML (ref 16–232)
TSH W/REFLEX TO FT4: 0.92 MIU/L
VITAMIN B12: 258 PG/ML (ref 200–1100)
VITAMIN D, 25-OH, TOTAL: 32 NG/ML (ref 30–100)

## 2024-09-18 RX ORDER — MULTIVITAMIN
TABLET ORAL
COMMUNITY

## 2024-09-18 RX ORDER — FERROUS SULFATE 325(65) MG
325 TABLET, DELAYED RELEASE (ENTERIC COATED) ORAL
COMMUNITY

## 2024-10-08 ENCOUNTER — ANESTHESIA EVENT (OUTPATIENT)
Dept: SURGERY | Facility: HOSPITAL | Age: 44
End: 2024-10-08
Payer: COMMERCIAL

## 2024-10-08 PROBLEM — N92.4 EXCESSIVE BLEEDING IN PREMENOPAUSAL PERIOD: Status: ACTIVE | Noted: 2024-05-29

## 2024-10-08 PROBLEM — N84.0 ENDOMETRIAL POLYP: Status: ACTIVE | Noted: 2024-10-08

## 2024-10-08 NOTE — ANESTHESIA PREPROCEDURE EVALUATION
PRE-OP EVALUATION    Patient Name: Brianda Tavarez    Admit Diagnosis: Endometrial polyp [N84.0]    Pre-op Diagnosis: Endometrial polyp [N84.0]    Truclear HYSTEROSCOPY DILATION AND CURETTAGE, Polypectomy    Anesthesia Procedure: Truclear HYSTEROSCOPY DILATION AND CURETTAGE, Polypectomy    Surgeons and Role:     * Batool Welch MD - Primary    Pre-op vitals reviewed.  Temp: 98.3 °F (36.8 °C)  Pulse: 81  Resp: 16  BP: 135/85  SpO2: 100 %  Body mass index is 21.49 kg/m².    Current medications reviewed.  Hospital Medications:   acetaminophen (Tylenol Extra Strength) tab 1,000 mg  1,000 mg Oral Once    scopolamine (Transderm-Scop) 1 MG/3DAYS patch 1 patch  1 patch Transdermal Once    lactated ringers infusion   Intravenous Continuous       Outpatient Medications:     Medications Prior to Admission   Medication Sig Dispense Refill Last Dose/Taking    ferrous sulfate 325 (65 FE) MG Oral Tab EC Take 1 tablet (325 mg total) by mouth daily with breakfast.   Past Week    Multiple Vitamin (MULTI-VITAMIN DAILY) Oral Tab Take by mouth.   Past Week    valACYclovir 500 MG Oral Tab TAKE 2 TABLETS BY MOUTH TWO TIMES A DAY FOR 1 DAY 30 tablet 1 Past Week    Sulfacetamide Sodium-Sulfur 10-5 % External Liquid  (Patient not taking: Reported on 8/14/2024)       Ketoconazole 2 % External Shampoo Apply 1 Application topically 3 (three) times a week. (Patient not taking: Reported on 8/14/2024)          Allergies: Patient has no known allergies.      Anesthesia Evaluation    Patient summary reviewed.    Anesthetic Complications  (-) history of anesthetic complications         GI/Hepatic/Renal    Negative GI/hepatic/renal ROS.                             Cardiovascular    Negative cardiovascular ROS.                                                   Endo/Other  Comment: Endometrial  polyp for hysteroscopy and D&C  Leiden factor V mutation                                Pulmonary    Negative pulmonary ROS.                        Neuro/Psych    Negative neuro/psych ROS.                                  Past Surgical History:   Procedure Laterality Date    Appendectomy      Breast surgery Left     papilloma      2015    Colonoscopy  2021    No polyps. Biopsy of duodenum normal. Done for chronic constipation/change in bowel habits    Colposcopy, cervix w/upper adjacent vagina; w/biopsy(s), cervix  2013    Colposcopy, cervix w/upper adjacent vagina; w/biopsy(s), cervix  2012    Cryocautery of cervix  2013    Diagnostic anoscopy  2020    Internal hemorrhoids - Dr. Margaret Pizano     Egd  2021 - impression: gastritis, LA Class A esophagitis. Biopsies negative for inflammation.     Hc endometrial sampling w or wo endocerv sampling  2024    Path -One 3 mm benign endometrial polyp. -Background of benign proliferative endometrium. -Negative for atypia, hyperplasia, or malignancy.Dr. Batool Welch    [facility] localization wire 1 site left (cpt=[id number]) Left     papilloma age 25 - removed     Tonsillectomy       Social History     Socioeconomic History    Marital status:    Tobacco Use    Smoking status: Never    Smokeless tobacco: Never   Vaping Use    Vaping status: Never Used   Substance and Sexual Activity    Alcohol use: No    Drug use: Never    Sexual activity: Yes     Partners: Male   Other Topics Concern    Caffeine Concern No    Stress Concern No    Weight Concern No    Special Diet No    Exercise No    Seat Belt Yes    Self-Exams Yes     History   Drug Use Unknown     Available pre-op labs reviewed.  Lab Results   Component Value Date    WBC 5.1 10/02/2024    RBC 4.55 10/02/2024    HGB 14.0 10/02/2024    HCT 43.9 10/02/2024    MCV 96.5 10/02/2024    MCH 30.8 10/02/2024    MCHC 31.9 (L) 10/02/2024    RDW 12.3 10/02/2024     10/02/2024               Airway      Mallampati: I  Mouth opening: >3 FB  TM distance: > 6 cm  Neck ROM: full Cardiovascular    Cardiovascular  exam normal.  Rhythm: regular  Rate: normal  (-) murmur   Dental             Pulmonary    Pulmonary exam normal.  Breath sounds clear to auscultation bilaterally.               Other findings              ASA: 1   Plan: MAC and general  NPO status verified and patient meets guidelines.        Comment: Plan for MAC. A detailed discussion of the risks and benefits of the proposed anesthetic was had in the preoperative area, including risk of conversion to a general anesthetic, nausea/vomiting, dental damage, sore throat and allergic/ adverse reactions to medications administered. The possibility of needing to convert to general anesthesia if necessary was discussed. Questions answered and patient wishes to proceed. Consent signed.     Plan/risks discussed with: patient                Present on Admission:   Endometrial polyp   Excessive bleeding in premenopausal period   Perimenopausal

## 2024-10-09 ENCOUNTER — HOSPITAL ENCOUNTER (OUTPATIENT)
Facility: HOSPITAL | Age: 44
Setting detail: HOSPITAL OUTPATIENT SURGERY
Discharge: HOME | End: 2024-10-09
Attending: OBSTETRICS & GYNECOLOGY | Admitting: OBSTETRICS & GYNECOLOGY
Payer: COMMERCIAL

## 2024-10-09 ENCOUNTER — ANESTHESIA (OUTPATIENT)
Dept: SURGERY | Facility: HOSPITAL | Age: 44
End: 2024-10-09
Payer: COMMERCIAL

## 2024-10-09 VITALS
HEIGHT: 67 IN | WEIGHT: 137.19 LBS | BODY MASS INDEX: 21.53 KG/M2 | RESPIRATION RATE: 16 BRPM | DIASTOLIC BLOOD PRESSURE: 88 MMHG | TEMPERATURE: 98 F | SYSTOLIC BLOOD PRESSURE: 146 MMHG | OXYGEN SATURATION: 100 % | HEART RATE: 80 BPM

## 2024-10-09 DIAGNOSIS — N84.0 ENDOMETRIAL POLYP: ICD-10-CM

## 2024-10-09 PROBLEM — Z98.890 STATUS POST DILATATION AND CURETTAGE: Status: ACTIVE | Noted: 2024-10-09

## 2024-10-09 PROBLEM — Z98.890 STATUS POST HYSTEROSCOPY: Status: ACTIVE | Noted: 2024-10-09

## 2024-10-09 LAB — B-HCG UR QL: NEGATIVE

## 2024-10-09 PROCEDURE — 0UDB8ZZ EXTRACTION OF ENDOMETRIUM, VIA NATURAL OR ARTIFICIAL OPENING ENDOSCOPIC: ICD-10-PCS | Performed by: OBSTETRICS & GYNECOLOGY

## 2024-10-09 PROCEDURE — 58558 HYSTEROSCOPY BIOPSY: CPT | Performed by: OBSTETRICS & GYNECOLOGY

## 2024-10-09 RX ORDER — ONDANSETRON 2 MG/ML
4 INJECTION INTRAMUSCULAR; INTRAVENOUS EVERY 6 HOURS PRN
Status: DISCONTINUED | OUTPATIENT
Start: 2024-10-09 | End: 2024-10-09

## 2024-10-09 RX ORDER — HYDROCODONE BITARTRATE AND ACETAMINOPHEN 5; 325 MG/1; MG/1
1 TABLET ORAL ONCE AS NEEDED
Status: DISCONTINUED | OUTPATIENT
Start: 2024-10-09 | End: 2024-10-09

## 2024-10-09 RX ORDER — PROCHLORPERAZINE EDISYLATE 5 MG/ML
5 INJECTION INTRAMUSCULAR; INTRAVENOUS EVERY 8 HOURS PRN
Status: DISCONTINUED | OUTPATIENT
Start: 2024-10-09 | End: 2024-10-09

## 2024-10-09 RX ORDER — LIDOCAINE HYDROCHLORIDE AND EPINEPHRINE 10; 10 MG/ML; UG/ML
INJECTION, SOLUTION INFILTRATION; PERINEURAL AS NEEDED
Status: DISCONTINUED | OUTPATIENT
Start: 2024-10-09 | End: 2024-10-09 | Stop reason: HOSPADM

## 2024-10-09 RX ORDER — NALOXONE HYDROCHLORIDE 0.4 MG/ML
0.08 INJECTION, SOLUTION INTRAMUSCULAR; INTRAVENOUS; SUBCUTANEOUS AS NEEDED
Status: DISCONTINUED | OUTPATIENT
Start: 2024-10-09 | End: 2024-10-09

## 2024-10-09 RX ORDER — DEXAMETHASONE SODIUM PHOSPHATE 4 MG/ML
VIAL (ML) INJECTION AS NEEDED
Status: DISCONTINUED | OUTPATIENT
Start: 2024-10-09 | End: 2024-10-09 | Stop reason: SURG

## 2024-10-09 RX ORDER — SODIUM CHLORIDE, SODIUM LACTATE, POTASSIUM CHLORIDE, CALCIUM CHLORIDE 600; 310; 30; 20 MG/100ML; MG/100ML; MG/100ML; MG/100ML
INJECTION, SOLUTION INTRAVENOUS CONTINUOUS
Status: DISCONTINUED | OUTPATIENT
Start: 2024-10-09 | End: 2024-10-09

## 2024-10-09 RX ORDER — SCOLOPAMINE TRANSDERMAL SYSTEM 1 MG/1
1 PATCH, EXTENDED RELEASE TRANSDERMAL ONCE
Status: DISCONTINUED | OUTPATIENT
Start: 2024-10-09 | End: 2024-10-09 | Stop reason: HOSPADM

## 2024-10-09 RX ORDER — ACETAMINOPHEN 500 MG
1000 TABLET ORAL ONCE AS NEEDED
Status: DISCONTINUED | OUTPATIENT
Start: 2024-10-09 | End: 2024-10-09

## 2024-10-09 RX ORDER — ACETAMINOPHEN 500 MG
1000 TABLET ORAL ONCE
Status: DISCONTINUED | OUTPATIENT
Start: 2024-10-09 | End: 2024-10-09 | Stop reason: HOSPADM

## 2024-10-09 RX ORDER — KETOROLAC TROMETHAMINE 30 MG/ML
INJECTION, SOLUTION INTRAMUSCULAR; INTRAVENOUS AS NEEDED
Status: DISCONTINUED | OUTPATIENT
Start: 2024-10-09 | End: 2024-10-09 | Stop reason: SURG

## 2024-10-09 RX ORDER — HYDROMORPHONE HYDROCHLORIDE 1 MG/ML
0.4 INJECTION, SOLUTION INTRAMUSCULAR; INTRAVENOUS; SUBCUTANEOUS EVERY 5 MIN PRN
Status: DISCONTINUED | OUTPATIENT
Start: 2024-10-09 | End: 2024-10-09

## 2024-10-09 RX ORDER — HYDROCODONE BITARTRATE AND ACETAMINOPHEN 5; 325 MG/1; MG/1
2 TABLET ORAL ONCE AS NEEDED
Status: DISCONTINUED | OUTPATIENT
Start: 2024-10-09 | End: 2024-10-09

## 2024-10-09 RX ORDER — ONDANSETRON 2 MG/ML
INJECTION INTRAMUSCULAR; INTRAVENOUS AS NEEDED
Status: DISCONTINUED | OUTPATIENT
Start: 2024-10-09 | End: 2024-10-09 | Stop reason: SURG

## 2024-10-09 RX ORDER — HYDROMORPHONE HYDROCHLORIDE 1 MG/ML
0.6 INJECTION, SOLUTION INTRAMUSCULAR; INTRAVENOUS; SUBCUTANEOUS EVERY 5 MIN PRN
Status: DISCONTINUED | OUTPATIENT
Start: 2024-10-09 | End: 2024-10-09

## 2024-10-09 RX ORDER — HYDROMORPHONE HYDROCHLORIDE 1 MG/ML
0.2 INJECTION, SOLUTION INTRAMUSCULAR; INTRAVENOUS; SUBCUTANEOUS EVERY 5 MIN PRN
Status: DISCONTINUED | OUTPATIENT
Start: 2024-10-09 | End: 2024-10-09

## 2024-10-09 RX ORDER — MIDAZOLAM HYDROCHLORIDE 1 MG/ML
INJECTION INTRAMUSCULAR; INTRAVENOUS AS NEEDED
Status: DISCONTINUED | OUTPATIENT
Start: 2024-10-09 | End: 2024-10-09 | Stop reason: SURG

## 2024-10-09 RX ADMIN — DEXAMETHASONE SODIUM PHOSPHATE 4 MG: 4 MG/ML VIAL (ML) INJECTION at 07:37:00

## 2024-10-09 RX ADMIN — ONDANSETRON 4 MG: 2 INJECTION INTRAMUSCULAR; INTRAVENOUS at 07:37:00

## 2024-10-09 RX ADMIN — SODIUM CHLORIDE, SODIUM LACTATE, POTASSIUM CHLORIDE, CALCIUM CHLORIDE: 600; 310; 30; 20 INJECTION, SOLUTION INTRAVENOUS at 08:08:00

## 2024-10-09 RX ADMIN — KETOROLAC TROMETHAMINE 30 MG: 30 INJECTION, SOLUTION INTRAMUSCULAR; INTRAVENOUS at 08:05:00

## 2024-10-09 RX ADMIN — SODIUM CHLORIDE, SODIUM LACTATE, POTASSIUM CHLORIDE, CALCIUM CHLORIDE: 600; 310; 30; 20 INJECTION, SOLUTION INTRAVENOUS at 07:32:00

## 2024-10-09 RX ADMIN — MIDAZOLAM HYDROCHLORIDE 2 MG: 1 INJECTION INTRAMUSCULAR; INTRAVENOUS at 07:32:00

## 2024-10-09 NOTE — ANESTHESIA POSTPROCEDURE EVALUATION
[facility] Patient Status:  Hospital Outpatient Surgery   Age/Gender 44 year old female MRN [id number]   Location [facility] SURGERY Attending Batool Welch MD   Hosp Day # 0 PCP Jessie Hanley DO       Anesthesia Post-op Note    Truclear HYSTEROSCOPY DILATION AND CURETTAGE    Procedure Summary       Date: 10/09/24 Room / Location:  MAIN OR 12 /  MAIN OR    Anesthesia Start: 0732 Anesthesia Stop: 0817    Procedure: Truclear HYSTEROSCOPY DILATION AND CURETTAGE (Uterus) Diagnosis:       Abnormal uterine bleeding      (Abnormal uterine bleeding [636675])    Surgeons: Batool Welch MD Anesthesiologist: Dionte Schafer MD    Anesthesia Type: MAC, general ASA Status: 1            Anesthesia Type: MAC, general    Vitals Value Taken Time   /65 10/09/24 0817   Temp 98.1 10/09/24 0817   Pulse 85 10/09/24 0817   Resp 12 10/09/24 0817   SpO2 100 10/09/24 0817       Patient Location: Same Day Surgery    Anesthesia Type: MAC    Airway Patency: patent    Postop Pain Control: adequate    Mental Status: preanesthetic baseline    Nausea/Vomiting: none    Cardiopulmonary/Hydration status: stable euvolemic    Complications: no apparent anesthesia related complications    Postop vital signs: stable    Dental Exam: Unchanged from Preop    Patient to be discharged home when criteria met.

## 2024-10-09 NOTE — DISCHARGE INSTRUCTIONS
Nothing in the vagina for 2 weeks.  No strenuous activity/exercise for 48 hours (it can increase bleeding from the uterus.)   Please still take frequent walks. Stairs are fine.   No tub baths for 2 weeks.   May shower.    Please call office if:  -fever 100.4 or higher    Please proceed to the Emergency Department at [facility] for any of the following:   -vaginal bleeding soaking greater than 1 pad per hour  -severe pelvic pain  -shortness of breath  -chest pain  -leg pain or swelling     You received a drug called Toradol which is an Anti Inflammatory at: 0805 am  If you are allowed to take Anti inflammatories:    Do not take any Anti Inflammatory like Motrin, Aleve or Ibuprophen until after: 205 pm  Please report any suspected allergic reactions or bleeding issues to your doctor

## 2024-10-09 NOTE — INTERVAL H&P NOTE
Pre-op Diagnosis: AUB (menorrhagia), Endometrial polyp [N84.0]    The above referenced H&P was reviewed by Batool Welch MD on 10/9/2024, the patient was examined and no significant changes have occurred in the patient's condition since the H&P was performed.  I discussed with the patient and/or legal representative the potential benefits, risks and side effects of this procedure; the likelihood of the patient achieving goals; and potential problems that might occur during recuperation.  I discussed reasonable alternatives to the procedure, including risks, benefits and side effects related to the alternatives and risks related to not receiving this procedure.  We will proceed with procedure as planned.

## 2024-10-09 NOTE — H&P
[facility]  Obstetrics and Gynecology   History & Physical  Established     Chief complaint: Menorrhagia, endometrial polyp tissue on endometrial biopsy    Subjective:     HPI: Brianda Tavarez is a 44 year old  with LMP Patient's last menstrual period was 2024 (exact date).    Menorrhagia, endometrial polyp tissue on endometrial biopsy    Goes by \"[employer]\" - ultrasonographer for [employer]    PMHx heterozygous factor V Leiden, chronic constipation (Dx type III dyssynergic defecation), anxiety, tension headache, retroverted uterus    Shortening cycles 23-24 days with some clots & variable amounts of bleeding from cycle to cycle. Some with \"flooding\" of blood, but only lasting 3-4 days total, the most of which are very light. Minimal to no cramping. Pelvic US in 3/2024 & 2024 suggestive of hemorrhagic functional cyst of right ovary 2.1 cm & probable evolving hemorrhagic cyst or corpus luteum cyst 2.2 cm right ovary respectively. She had noticed some intermenstrual bleeding (just spotting with wiping) about mid cycle. Overall pattern suspicious for ovulatory dysfunction/perimenopausal. Endometrial biopsy was recommended & performed on 24 with uterus sounding to 7 cm. Path benign proliferative endometrium, +endometrial polyp    Recommend OR for hysteroscopy, dilation & curettage, polypectomy.     Patient's last menstrual period was 2024 (exact date).    PCP: Jessie Hanley DO    Review of Systems   Respiratory: Negative.     Cardiovascular: Negative.    Gastrointestinal:  Positive for constipation. Negative for blood in stool.        Chronic constipation. No recent blood in the stool.   Was referred to PFPT for pelvic floor dyssynergia - helped but stopped doing exercises so having some issues again.    Genitourinary:  Positive for menstrual problem and breast pain. Negative for dysuria, frequency, vaginal bleeding, vaginal discharge, pelvic pain, dyspareunia, breast mass and hot flashes.         Hot flashes N  Night sweats N    Is sexually active.  No dyspareunia or bleeding.   Vaginal hyaluronic acid - did not like it - felt oil-like in texture    Breasts are very tender again like prior to taking Serenol in the past.    Skin:         Hair thinning over about 1 year (around 2023)   Neurological:  Positive for headaches.        No aura   Psychiatric/Behavioral:  The patient is not nervous/anxious.         High stress, anxiety in the past. Was feeling burned out.   Didn't like the Lexapro - how she felt on it. Took only half of the prescribed dose for 4 days.      with depression for >5 years since his dad     starts to get depressed around  time - lasts all winter until it lightens up. He had a lightbox. He travels to [place] a lot for his job.     As of 2024 - she &  much better.   As of 2024 c/o irritability, lack of patience.      GYN Hx  Menses usually regular q 32 days -> 28 days -> 25 days -> 21-24 days     10/28/21 - cycles short at 21-24 days x 5 days of light to normal flow, no cramping. Has always had a few days of spotting right before full low. No cramping. No hot flashes. Mom menopause at 47     24 Menses 23-28 days x variable flow from month to month. Can be normal or sometimes \"flooding\" - a lot at once with clot. Happened maybe2-3 times this past year, during  it actually went through her clothes     Pelvic US 3/8/24 - LMP 3/8/24 - cycle day 1 - normal uterus, endometrium 7 mm, Probable hemorrhagic functional cyst in the right ovary measuring 21 x 18 x 16 mm with low level internal echoes, possible thin internal septation, and no significant internal vascularity. Trace free fluid in cul de sac. Per Dr. Rutledge \"Small ovarian cyst - most likely from ovulating, but will repeat US in 12 weeks. Ordered\"     2024 - C/o heavier cycles with clots at times. Cycles are 21-24 days now x 5 days x no cramping. 1st part of the 1st day can be  a lot. One month ok & the next heavier. Spotting about 3 times only with wiping about mid cycle. The day of the pelvic US in 3/8/24 was heavier flow. Breasts intermittently very tender - the Serenol was helping with that. No hot flashes or night sweats. Stopped Serenol about 1 year prior. Mood a little more up & down but overall stress & anxiety are better. Images of pelvic US from 3/8/24 reviewed. Uterus mild to moderate adenomyosis, endometrium not very distinct. Ovarian cyst relatively simple with some debris noted. Most consistent with a hemorrhagic ovarian cyst. Possible underlying endometrial polyp. Discussed progestin only therapy to help with bleeding. Declines. Patient prefers to see how the repeat pelvic US looks & going to re-try Serenol supplement for mood & breast tenderness.     6/5/24 Pelvic US at [facility] - cycle day #15 - follow up ovarian cyst noted on 3/8/24 pelvic US  -LMP 5/22/24   -uterus retroverted 4.6 x 5.1 x 8.8 cm  -endometrial stripe 1.3 cm  -no fibroids  -Hypoechoic right ovarian cyst 2.2 x 1.9 x 2.0 cm with ill defined internal echoes & mildly thickened wall, though no internal vascularity or significant peripheral vascularity.   -ovaries normal in size  -small free fluid  Impression:   Right ovarian cyst 2.2 cm likely evolving hemorrhagic cyst or corpus luteum cyst  Uterine size and endometrial stripe normal  Small pelvic free fluid, likely physiological.     8/2024 - 1st day just dark brown. When she wakes up after the night after about 1 hour will pass a large blood clot & then quarter sized clots & heavier for 3-4 hours & then little ones & then tapers off to almost nothing. Lasting 3-4 days total. Minimal cramping. Significant headaches at times. Last week had HA for almost 1 week. No pelvic pain. Some irritability. Gradually thinning hair. Had restarted Serenol & took for about 2-3 weeks - didn't notice improvement yet & then stopped it because she had some  stomach aching.     Contraception: declines. 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 Declines      H/o colposcopy & cryosurgery of cervix in  &    No history of LEEP/conization.  10/22/2020 Pap & HPV negative   10/28/21 Pap & HPV negative   22 Pap & HPV negative   24 Pap & HPV negative     Mammogram  -24 Diagnostic bilateral - extremely dense. Stable bilateral mammogram with stable left breast magnification views. ROUTINE MAMMOGRAM AND CLINICAL EVALUATION IN 12 MONTHS.     Colonoscopy - 2021 negative.     OB History:  OB History    Para Term  AB Living   1 1 1 0 0 1   SAB IAB Ectopic Multiple Live Births   0 0 0 0 1     OB History    Para Term  AB Living   1 1 1     1   SAB IAB Ectopic Multiple Live Births           1      # Outcome Date GA Lbr Alcides/2nd Weight Sex Type Anes PTL Lv   1 Term 05/11/15 [id number] 16:15 / 02:17 8 lb 1.5 oz (3.67 kg) F CS-LTranv EPI N YAMIL      Complications: Fetal tachycardia       Meds:  No current facility-administered medications on file prior to encounter.     Current Outpatient Medications on File Prior to Encounter   Medication Sig Dispense Refill    ferrous sulfate 325 (65 FE) MG Oral Tab EC Take 1 tablet (325 mg total) by mouth daily with breakfast.      Multiple Vitamin (MULTI-VITAMIN DAILY) Oral Tab Take by mouth.      valACYclovir 500 MG Oral Tab TAKE 2 TABLETS BY MOUTH TWO TIMES A DAY FOR 1 DAY 30 tablet 1    Sulfacetamide Sodium-Sulfur 10-5 % External Liquid  (Patient not taking: Reported on 2024)      Ketoconazole 2 % External Shampoo Apply 1 Application topically 3 (three) times a week. (Patient not taking: Reported on 2024)         All:  No Known Allergies    PMH:  Past Medical History:   Diagnosis Date    Abnormal ultrasound of breast ,    Anxiety     ASCUS with positive high risk HPV cervical 2012    Belching     Bloating     EGD 21 - gastritis, LA Class A esophagitis      Chronic constipation     Underwent EGD & colonoscopy, anorectal manometry-diagnosed with dyssynergic defecation. Prescribed pelvic floor PT    Constipation     Dyssynergic defecation 2021    Anorectal manometry 21 - type III dyssynergic defecation     Elevated fasting glucose 11/10/2020    Slightly elevated fasting glucose. Normal A1c at same time.     Flatulence/gas pain/belching     H/O pelvic ultrasound 2024    Mildly complex hypoechoic cyst RT ovary 2.2cm w/ill-defined internal echoes &mildly thickened wall, likely evolving hemorrhagic cyst or corpus luteum cyst. LT ovary: WNL. Uterine size & endometrial stripe thickness WNL for premenopausal status. No evidence of uterine fibroids. Multiple subcentimeter nabothian cysts in the cervix. Small amount of pelvic free fluid, physiologic premenopausal female.    Hemorrhoids     Heterozygous factor V Leiden mutation (HCC) 2015    History of cold sores     gets them right under her nose    Human papilloma virus infection ,    Infertility, female     Male factor.     Internal hemorrhoids 2020    with rectal bleeding    Iron deficiency 2024    Low grade squamous intraepithelial lesion (LGSIL) on cervical Pap smear , hpv positive    Menstrual migraine     No aura     Pap smear for cervical cancer screening 10/28/2021    Pap & HPV negative.     Tension headache     worsening around 2021. Increased stress & anxiety.     Vitamin B12 deficiency 2024        PSH:  Past Surgical History:   Procedure Laterality Date    Appendectomy      Breast surgery Left     papilloma          Colonoscopy  2021    No polyps. Biopsy of duodenum normal. Done for chronic constipation/change in bowel habits    Colposcopy, cervix w/upper adjacent vagina; w/biopsy(s), cervix  2013    Colposcopy, cervix w/upper adjacent vagina; w/biopsy(s), cervix  2012    Cryocautery of cervix  2013     Diagnostic anoscopy  05/04/2020    Internal hemorrhoids - Dr. Margaret Pizano     Egd  04/16/2021 4/16/21 - impression: gastritis, LA Class A esophagitis. Biopsies negative for inflammation.     Hc endometrial sampling w or wo endocerv sampling  08/14/2024    Path -One 3 mm benign endometrial polyp. -Background of benign proliferative endometrium. -Negative for atypia, hyperplasia, or malignancy.Dr. Batool Welch    [facility] localization wire 1 site left ([id number]=[id number]) Left     papilloma age 25 - removed     Tonsillectomy         Social History:  Social History     Socioeconomic History    Marital status:    Tobacco Use    Smoking status: Never    Smokeless tobacco: Never   Vaping Use    Vaping status: Never Used   Substance and Sexual Activity    Alcohol use: No    Drug use: Never    Sexual activity: Yes     Partners: Male   Other Topics Concern    Caffeine Concern No    Stress Concern No    Weight Concern No    Special Diet No    Exercise No    Seat Belt Yes    Self-Exams Yes     Social Determinants of Health      [facility]    Social Connections    [facility]    Housing Stability      Family History:  Family History   Problem Relation Age of Onset    Hypertension Father         stroke 8/22    Colon Polyps Father     Lipids Father         high cholesterol    Heart Disorder Mother     Pacemaker Mother     Hypertension Mother     Lipids Mother         high cholesterol    No Known Problems Daughter     No Known Problems Maternal Grandmother     No Known Problems Maternal Grandfather     No Known Problems Paternal Grandmother     No Known Problems Paternal Grandfather     Breast Cancer Maternal Cousin Female         dx age 60's    Ovarian Cancer Neg     Colon Cancer Neg        Objective:     Vitals:    09/18/24 1641   Weight: 135 lb (61.2 kg)   Height: 67\"           Body mass  index is 21.14 kg/m².    Physical Exam:  Physical Exam   Nursing note and vitals reviewed.   Constitutional: She appears well-developed and well-nourished. No distress.   HENT:   Head: Normocephalic and atraumatic.   Eyes: Conjunctivae and EOM are normal.   Pulmonary/Chest: Right breast exhibits no mass. Left breast exhibits no mass.   Genitourinary:    Genitourinary Comments: VULVA: normal appearing vulva with no masses, tenderness or lesions  URETHRA: unremarkable.    PERINEUM: intact   VAGINA: normal appearing vagina with normal color and discharge, no lesions   CERVIX: normal appearing cervix without discharge or lesions  UTERUS: retroverted, non tender, 7-8 wk sized  ADNEXA: normal adnexa in size, nontender and no masses  PELVIC FLOOR: moderate hypertonicity, mild tenderness       Neurological: She is alert.   Psychiatric: She has a normal mood and affect. Her speech is normal and behavior is normal. Judgment and thought content normal.        Labs:  Component      Latest Ref Rng 8/21/2024 10/2/2024   WBC      3.8 - 10.8 Thousand/uL  5.1    RBC      3.80 - 5.10 Million/uL  4.55    Hemoglobin      11.7 - 15.5 g/dL  14.0    Hematocrit      35.0 - 45.0 %  43.9    MCV      80.0 - 100.0 fL  96.5    MCH      27.0 - 33.0 pg  30.8    MCHC      32.0 - 36.0 g/dL  31.9 (L)    RDW      11.0 - 15.0 %  12.3    Platelet Count      140 - 400 Thousand/uL  311    MPV      7.5 - 12.5 fL  10.0    Neutrophils Absolute      1,500 - 7,800 cells/uL  2,606    Lymphocytes Absolute      850 - 3,900 cells/uL  2,142    Monocytes Absolute      200 - 950 cells/uL  301    Eosinophils Absolute      15 - 500 cells/uL  20    Basophils Absolute      0 - 200 cells/uL  31    Neutrophils %      %  51.1    Lymphocytes %      %  42.0    Monocytes %      %  5.9    Eosinophils %      %  0.4    Basophils %      %  0.6    VITAMIN D, 25-OH, TOTAL      30 - 100 ng/mL 32     TSH      mIU/L 0.92     FERRITIN      16 - 232 ng/mL 10 (L)     Vitamin B12      200  - 1,100 pg/mL 258        Case Report   Surgical Pathology                                Case: [id number]                                   Authorizing Provider:  Batool Welch MD       Collected:           2024 08:54 AM           Ordering Location:     [facility]    Received:            2024 08:54 AM                                  [facility] - OB/GYN                                                           Pathologist:           Luigi Farias MD                                                           Specimen:    Endometrium, ENDOMETRIAL BIOPSY                                                            Final Diagnosis:   Endometrial biopsy:  -One 3 mm benign endometrial polyp.  -Background of benign proliferative endometrium.  -Negative for atypia, hyperplasia, or malignancy.       Imagin24 Pelvic US at [facility] - cycle day #15 - follow up ovarian cyst noted on 3/8/24 pelvic US  -LMP 24   -uterus retroverted 4.6 x 5.1 x 8.8 cm  -endometrial stripe 1.3 cm  -no fibroids  -Hypoechoic right ovarian cyst 2.2 x 1.9 x 2.0 cm with ill defined internal echoes & mildly thickened wall, though no internal vascularity or significant peripheral vascularity.   -ovaries normal in size  -small free fluid  Impression:   Right ovarian cyst 2.2 cm likely evolving hemorrhagic cyst or corpus luteum cyst  Uterine size and endometrial stripe normal  Small pelvic free fluid, likely physiological.     Assessment:     Brianda Tavarez is a 44 year old  female shortening cycles & heavier but short lived bleeds, small right ovarian cyst.  Suspect ovulatory dysfunction/perimenopausal but also with endometrial polyp noted on endometrial biopsy in office.     There are no diagnoses linked to this encounter.           Plan:     Menometrorrhagia  -variable cycle length, some  shorter, with variable flow  -repeat pelvic US 6/5/24 at outside facility (no images available) 2.2 cm possible hemorrhagic corpus luteum right ovary   -Overall clinical picture consistent with ovulatory dysfunction/perimenopause given the shortening of the cycles.    -EMB 8/14/2024 - sounded 7 cm - endometrial polyp  -recommend OR for hysteroscopy, D&C, polypectomy.     Right ovarian cyst   -Pelvic US in 3/2024 suggestive of hemorrhagic functional cyst of right ovary 2.1 cm  -Pelvic US US 6/5/24 at outside facility (no images available) 2.2 cm possible hemorrhagic corpus luteum right ovary   -Overall clinical picture consistent with ovulatory dysfunction/perimenopause given the shortening of the cycles.   -Not sure if persistent or recurrent cyst on the right ovary.   -Ddx hemorrhagic ovarian cyst/functional cyst, endometrioma, benign ovarian neoplasm, less likely malignant ovarian neoplasm.  -Ovarian cyst - could pursue ovarian suppression with progestin only method to see if resolves, or could observe & re-image. She is not too worried.   -she reports she did not do well in past on OCP.   -No estrogen due to Factor V Leiden heterozygosity  -Discussed progestin only therapy to help with bleeding - Slynd, norethindrone acetate, Nexplanon, LNG-IUD. Declines    Hair thinning x 1 year (as of 8/2024)  -had normal TSH in 11/2023  -h/o vit D deficiency  -8/2014 low-normal vit B12, low ferritin, just sufficient vit D, normal TSH    Breast tenderness & mood   -re tried Serenol, but had stomach upset.     2/7/24 Pap & HPV negative   -prefers yearly. Up to date.     Dense breasts,   -2/7/24 breast exam Dr. Rutledge  -2/22/24 Diagnostic bilateral mammogram - extremely dense. Stable bilateral mammogram with stable left breast magnification views. ROUTINE MAMMOGRAM AND CLINICAL EVALUATION IN 12 MONTHS.     Colonoscopy - 4/2021 negative.  HPV vaccine declines.     Contraception - Declines. 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      Chronic constipation apparently due to pelvic floor dysfunction   -s/p anorectal manometry 4/22/21 -> Dx type III dyssynergic defecation   -s/p colonoscopy 4/16/21 neg    -pelvic floor PT helped    To OR. RTC due for WWE after 2/7/25     Batool Welch MD  [facility] - OBGYN

## 2024-10-09 NOTE — OPERATIVE REPORT
Operative Report:     Brianda Tavarez  : 1980     Date of procedure: 10/9/24    INDICATIONS:   44 year old  female with shortening menstrual cycles 23-24 days with some clots & variable amounts of bleeding from cycle to cycle. Pelvic US in 3/2024 & 2024 suggestive of hemorrhagic functional cyst of right ovary 2.1 cm & probable evolving hemorrhagic cyst or corpus luteum cyst 2.2 cm right ovary respectively. She had noticed some intermenstrual bleeding (just spotting with wiping) about mid cycle. Overall pattern suspicious for ovulatory dysfunction/perimenopausal. Endometrial biopsy revealed benign proliferative endometrium and endometrial polyp. OR for hysteroscopy, dilation & curettage, polypectomy recommended.     PRE-OP DIAGNOSIS:   Menorrhagia, perimenopausal  Endometrial polyp        POST-OP DIAGNOSIS:   Menorrhagia, perimenopausal    PROCEDURE(S):   Hysteroscopy, Dilation and curettage of uterus using Truclear hysteroscopic morcellator     ANESTHESIA: MAC with paracervical block     SURGEON(S): Batool Welch MD     ESTIMATED BLOOD LOSS: 5 mL           DRAINS: None            UTERINE DISTENSION MEDIUM: Normal Saline 0.9%  DEFICIT: 180 mL     SPECIMENS: Endometrial curettings               COMPLICATIONS:  None apparent     FINDINGS: Mildly atrophic external genitalia, no lesions. Urethra atrophic with possible slight caruncle. Parous cervix, no lesions. Normal sized retroverted uterus. Uterus sounded to 7 cm. Endometrial cavity normal in shape. Endometrium relatively thin. Bilateral tubal ostia seen. No obvious polyps or fibroids noted.     PROCEDURE: This procedure was fully reviewed with the patient and written informed consent was obtained after discussing risks, benefits, indication and alternatives. All questions were answered.     The patient was taken to operating room. SCDs were placed. MAC was initiated. She was placed in dorsal lithotomy position. Exam under anesthesia performed. She  was prepped and draped in normal sterile fashion. The bladder was not drained. A sterile bivalved speculum was placed in the vagina. Paracervical block placed. A single tooth tenaculum was used to grasp the posterior lip of the cervix (due to retroversion), but the dilator would not pass. The cervix was re-grasped on the anterior lip, and the dilators passed without difficulty. The cervix was gently dilated with Hegar dilators to 6 mm. Uterus was sounded to 7 cm.     The hysteroscopic system was calibrated. The hysteroscope was gently advanced into the endometrial cavity. The uterine cavity was visualized and the previous findings noted. The TruClear Mini hysteroscopic blade was then advanced through the hysteroscope under direct visualization applied to sample the tissue of the endometrium along all the walls of the uterus. The tissue was sent to pathology.     The hysteroscope was then removed from the uterus. The single tooth tenaculum was removed and good hemostasis was noted. Sponge, lap, needle, and instrument counts correct by two counts. The patient was taken to recovery room in stable condition.     DISPOSITION:  To recovery room in stable condition        CONDITION: Stable      Batool Welch MD   [facility] - OBGYN

## 2024-10-09 NOTE — SPIRITUAL CARE NOTE
Spiritual Care Visit Note    Patient Name: Brianda Tavarez Date of Spiritual Care Visit: 10/09/24   : 1980 Primary Dx: Excessive bleeding in premenopausal period       Referred By: Referral From: Other (Comment)    Spiritual Care Taxonomy:    Intended Effects: Establish rapport and connectedness    Methods: Offer support    Interventions: Active listening;Ask guided questions    Visit Type/Summary:     - Spiritual Care: Consulted with RN prior to visit.  remains available as needed for follow up.    Spiritual Care support can be requested via an Epic consult. For urgent/immediate needs, please contact the On Call  at: [facility]: ext [phone]

## 2024-10-18 ENCOUNTER — TELEPHONE (OUTPATIENT)
Facility: CLINIC | Age: 44
End: 2024-10-18

## 2024-10-18 NOTE — TELEPHONE ENCOUNTER
Spoke with patient. Patient is currently at the dentist. Patient would like a Buzzient message with the information.     H-art (WPP)hart message sent.

## 2024-10-18 NOTE — TELEPHONE ENCOUNTER
Spoke with patient. We reviewed her pathology results from Hysteroscopy on 10/09/2024. Patient's biggest concern is her iron and wondering what next steps are.     Routed to Dr. Welch to review.

## 2025-01-23 ENCOUNTER — TELEPHONE (OUTPATIENT)
Facility: CLINIC | Age: 45
End: 2025-01-23

## 2025-01-23 DIAGNOSIS — R92.343 EXTREMELY DENSE TISSUE OF BOTH BREASTS ON MAMMOGRAPHY: Primary | ICD-10-CM

## 2025-01-23 DIAGNOSIS — R92.1 BREAST CALCIFICATIONS ON MAMMOGRAM: ICD-10-CM

## 2025-01-23 NOTE — TELEPHONE ENCOUNTER
Patient requests a Bilateral Diagnostic Mammogram order. (The current one expires on 02/22/2025 - patient can't make it). WWE is scheduled for 02/19/2025

## 2025-02-19 ENCOUNTER — OFFICE VISIT (OUTPATIENT)
Dept: OBGYN CLINIC | Facility: CLINIC | Age: 45
End: 2025-02-19
Payer: COMMERCIAL

## 2025-02-19 VITALS
BODY MASS INDEX: 22.66 KG/M2 | WEIGHT: 144.38 LBS | HEIGHT: 67 IN | SYSTOLIC BLOOD PRESSURE: 124 MMHG | DIASTOLIC BLOOD PRESSURE: 81 MMHG | HEART RATE: 94 BPM

## 2025-02-19 DIAGNOSIS — Z01.419 ENCOUNTER FOR ANNUAL ROUTINE GYNECOLOGICAL EXAMINATION: Primary | ICD-10-CM

## 2025-02-19 DIAGNOSIS — Z12.4 ENCOUNTER FOR PAPANICOLAOU SMEAR FOR CERVICAL CANCER SCREENING: ICD-10-CM

## 2025-02-19 PROCEDURE — 3008F BODY MASS INDEX DOCD: CPT | Performed by: STUDENT IN AN ORGANIZED HEALTH CARE EDUCATION/TRAINING PROGRAM

## 2025-02-19 PROCEDURE — 3074F SYST BP LT 130 MM HG: CPT | Performed by: STUDENT IN AN ORGANIZED HEALTH CARE EDUCATION/TRAINING PROGRAM

## 2025-02-19 PROCEDURE — 87624 HPV HI-RISK TYP POOLED RSLT: CPT | Performed by: STUDENT IN AN ORGANIZED HEALTH CARE EDUCATION/TRAINING PROGRAM

## 2025-02-19 PROCEDURE — 3079F DIAST BP 80-89 MM HG: CPT | Performed by: STUDENT IN AN ORGANIZED HEALTH CARE EDUCATION/TRAINING PROGRAM

## 2025-02-19 PROCEDURE — 99459 PELVIC EXAMINATION: CPT | Performed by: STUDENT IN AN ORGANIZED HEALTH CARE EDUCATION/TRAINING PROGRAM

## 2025-02-19 PROCEDURE — 99396 PREV VISIT EST AGE 40-64: CPT | Performed by: STUDENT IN AN ORGANIZED HEALTH CARE EDUCATION/TRAINING PROGRAM

## 2025-02-19 NOTE — PROGRESS NOTES
[facility]  Obstetrics and Gynecology   History & Physical    Chief complaint:   Chief Complaint   Patient presents with    Wellness Visit     Annual Gyn Exam         HPI: Brianda Tavarez is a 44 year old  with Patient's last menstrual period was 2025 (exact date).      Pt here for annual GYN exam  Had hysteroscopy D&C with Dr Welch 10/2024 - done for more frequent menses, some heavy menses, and had possible endometrial polyp on EMB. Intra-op no obvious polyp but path from D&C did show features of poss benign polyp  Pt is actually feeling better now, menses have been lighter, not having \"flooding\" episodes anymore  Still q23-24d, sometimes up to 30d  Breast tenderness is better, except around menses. PMS - just 1-2d before menses gets very irritable. Might try serenol again, took in past but stopped  Didn't do well on SSRI in past - made her almost more anxious. Overall her anxiety is not too bad right now, she is feeling well overall  No anemia on recent labs but her ferritin was low. Dr welch had recommend PO Fe supplement, pt not longer taking since had very bad constipation, would like to check levels again. Not sure if would want IV Fe. Not really symptomatic    Hx Prior Abnormal Pap: No  Pap Date: 24  Pap Result Notes: Negative    PMHx/Surghx reviewed, below. Of note: factor V leiden heterozygous, chronic constipation (Dx type III dyssynergic defecation), anxiety, tension headache, retroverted uterus     Prior note -  Pelvic US 3/8/24 - LMP 3/8/24 - cycle day 1 - normal uterus, endometrium 7 mm, Probable hemorrhagic functional cyst in the right ovary measuring 21 x 18 x 16 mm with low level internal echoes, possible thin internal septation, and no significant internal vascularity. Trace free fluid in cul de sac. Per Dr. Rutledge \"Small ovarian cyst - most likely from ovulating, but will repeat US in 12 weeks. Ordered\"      24 Pelvic US at [facility] -  cycle day #15 - follow up ovarian cyst noted on 3/8/24 pelvic US  -LMP 24   -uterus retroverted 4.6 x 5.1 x 8.8 cm  -endometrial stripe 1.3 cm  -no fibroids  -Hypoechoic right ovarian cyst 2.2 x 1.9 x 2.0 cm with ill defined internal echoes & mildly thickened wall, though no internal vascularity or significant peripheral vascularity.   -ovaries normal in size  -small free fluid  Impression:   Right ovarian cyst 2.2 cm likely evolving hemorrhagic cyst or corpus luteum cyst  Uterine size and endometrial stripe normal  Small pelvic free fluid, likely physiological.       H/o colposcopy & cryosurgery of cervix in  &    No history of LEEP/conization.  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  Skin/Breast:  Denies any breast lumps, or discharge. +tenderness some times  Neurological:  denies headaches, extremity weakness or numbness.  Psychiatric: denies depression or anxiety.  Endocrine:   denies excessive thirst or urination.  Heme/Lymph:  denies history of anemia, easy bruising or bleeding.    OB History:  OB History    Para Term  AB Living   1 1 1     1   SAB IAB Ectopic Multiple Live Births           1      # Outcome Date GA Lbr Alcides/2nd Weight Sex Type Anes PTL Lv   1 Term 05/11/15 39w6d 16:15 / 02:17 8 lb 1.5 oz (3.67 kg) F CS-LTranv EPI N YAMIL      Complications: Fetal tachycardia       Meds:  Current Outpatient Medications on File Prior to Visit    Medication Sig Dispense Refill    ferrous sulfate 325 (65 FE) MG Oral Tab EC Take 1 tablet (325 mg total) by mouth daily with breakfast.      Multiple Vitamin (MULTI-VITAMIN DAILY) Oral Tab Take by mouth.      valACYclovir 500 MG Oral Tab TAKE 2 TABLETS BY MOUTH TWO TIMES A DAY FOR 1 DAY 30 tablet 1     No current facility-administered medications on file prior to visit.       All:  No Known Allergies    PMH:  Past Medical History:    Abnormal ultrasound of breast    Anxiety    ASCUS with positive high risk HPV cervical    Belching    Bloating    EGD 4/16/21 - gastritis, LA Class A esophagitis     Chronic constipation    Underwent EGD & colonoscopy, anorectal manometry-diagnosed with dyssynergic defecation. Prescribed pelvic floor PT    Constipation    Dyssynergic defecation    Anorectal manometry 4/22/21 - type III dyssynergic defecation     Elevated fasting glucose    Slightly elevated fasting glucose. Normal A1c at same time.     Flatulence/gas pain/belching    H/O pelvic ultrasound    Mildly complex hypoechoic cyst RT ovary 2.2cm w/ill-defined internal echoes &mildly thickened wall, likely evolving hemorrhagic cyst or corpus luteum cyst. LT ovary: WNL. Uterine size & endometrial stripe thickness WNL for premenopausal status. No evidence of uterine fibroids. Multiple subcentimeter nabothian cysts in the cervix. Small amount of pelvic free fluid, physiologic premenopausal female.    Hemorrhoids    Heterozygous factor V Leiden mutation (HCC)    History of cold sores    gets them right under her nose    Human papilloma virus infection    Infertility, female    Male factor.     Internal hemorrhoids    with rectal bleeding    Iron deficiency    Low grade squamous intraepithelial lesion (LGSIL) on cervical Pap smear    05/2013 hpv positive    Menstrual migraine    No aura     Pap smear for cervical cancer screening    Pap & HPV negative.     Tension headache    worsening around 5/2021. Increased stress & anxiety.      Vitamin B12 deficiency       PSH:  Past Surgical History:   Procedure Laterality Date    Appendectomy      Breast surgery Left     papilloma          Colonoscopy  2021    No polyps. Biopsy of duodenum normal. Done for chronic constipation/change in bowel habits    Colposcopy, cervix w/upper adjacent vagina; w/biopsy(s), cervix  2013    Colposcopy, cervix w/upper adjacent vagina; w/biopsy(s), cervix  2012    Cryocautery of cervix  2013    Diagnostic anoscopy  2020    Internal hemorrhoids - Dr. Margaret Pizano     Egd  2021 - impression: gastritis, LA Class A esophagitis. Biopsies negative for inflammation.     Hc endometrial sampling w or wo endocerv sampling  2024    Path -One 3 mm benign endometrial polyp. -Background of benign proliferative endometrium. -Negative for atypia, hyperplasia, or malignancy.Dr. Batool Welch    Hysteroscopy,with sampling  10/09/2024    HSC, D&C done for suspected endometrial polyp, but none noted on HSC. Path: one fragment c/w endometrial polyp.Dr. Batool Welch    [facility] localization wire 1 site left ([id number]) Left     papilloma age 25 - removed     Tonsillectomy         Social History:  Social History     Socioeconomic History    Marital status:      Spouse name: Not on file    Number of children: Not on file    Years of education: Not on file    Highest education level: Not on file   Occupational History    Not on file   Tobacco Use    Smoking status: Never    Smokeless tobacco: Never   Vaping Use    Vaping status: Never Used   Substance and Sexual Activity    Alcohol use: No    Drug use: Never    Sexual activity: Yes     Partners: Male   Other Topics Concern     Service Not Asked    Blood Transfusions Not Asked    Caffeine Concern No    Occupational Exposure Not Asked    Hobby Hazards Not Asked    Sleep Concern Not Asked    Stress Concern No    Weight Concern No    Special Diet No    Back Care  Not Asked    Exercise No    Bike Helmet Not Asked    Seat Belt Yes    Self-Exams Yes   Social History Narrative    Not on file     Social Drivers of Health     Food Insecurity: Not on file   Transportation Needs: Not on file   Stress: Not on file   Housing Stability: Low Risk  (7/8/2021)    [facility]    Housing Stability     Mortgage Payment Concerns?: Not on file     Number of Places Lived in the Last Year: Not on file     Unstable Housing?: Not on file        Family History:  Family History   Problem Relation Age of Onset    Hypertension Father         stroke 8/22    Colon Polyps Father     Lipids Father         high cholesterol    Heart Disorder Mother     Pacemaker Mother     Hypertension Mother     Lipids Mother         high cholesterol    No Known Problems Daughter     No Known Problems Maternal Grandmother     No Known Problems Maternal Grandfather     No Known Problems Paternal Grandmother     No Known Problems Paternal Grandfather     Breast Cancer Maternal Cousin Female         dx age 60's    Ovarian Cancer Neg     Colon Cancer Neg        PHYSICAL EXAM:     Vitals:    02/19/25 0923   BP: 124/81   Pulse: 94   Weight: 144 lb 6.4 oz (65.5 kg)   Height: 67\"       Body mass index is 22.62 kg/m².      Constitutional: well developed, well nourished  Head/Face: normocephalic  Breast: normal without palpable masses, tenderness, asymmetry, nipple discharge, nipple retraction or skin changes  Abdomen:  soft, nontender, nondistended, no masses  Skin/Hair: no unusual rashes or bruises  Extremities: no edema, no cyanosis  Psychiatric:  Oriented to time, place, person and situation. Appropriate mood and affect    Pelvic Exam:  External Genitalia: normal appearance, hair distribution, and no lesions  Urethral Meatus:  normal in size, location, without lesions and prolapse  Bladder:  No fullness, masses or tenderness  Vagina:  Normal appearance without  lesions, no abnormal discharge  Cervix:  Normal without tenderness on motion  Uterus: normal in size, contour, position, mobility, without tenderness  Adnexa: normal without masses or tenderness  Perineum: normal  Anus: no hemorrhoids     Assessment & Plan:     Brianda Tavarez is a 44 year old      Diagnoses and all orders for this visit:    Encounter for annual routine gynecological examination  -     CBC With Differential With Platelet  -     Comp Metabolic Panel (14)  -     Lipid Panel  -     Assay, Thyroid Stim Hormone  -     Iron And Tibc  -     Ferritin    Encounter for Papanicolaou smear for cervical cancer screening  -     ThinPrep PAP Smear; Future  -     Hpv Dna  High Risk , Thin Prep Collect; Future      Normal breast and pelvic exam  Will repeat Fe studies. If not anemic, pt may not take Fe supplement due to constipation, might want to try a lower dose eg in multivitamin. Considering IV Fe, but may hold off, not actually symptomatic     Healthcare maintenance:  Pap: done yearly per pt request   Mammogram - has scheduled  Colonoscopy - 2021 - 10y repeat  DEXA, age 63 (earlier if postmenopausal with personal/fam hx fragility fx, underweight, smoking/excessive ETOH, steroids, RA)        Faviola Rutledge MD  [facility] - OBGYN

## 2025-02-20 LAB — HPV E6+E7 MRNA CVX QL NAA+PROBE: NEGATIVE

## 2025-03-02 LAB
% SATURATION: 33 % (CALC) (ref 16–45)
ABSOLUTE BASOPHILS: 28 CELLS/UL (ref 0–200)
ABSOLUTE EOSINOPHILS: 39 CELLS/UL (ref 15–500)
ABSOLUTE LYMPHOCYTES: 2206 CELLS/UL (ref 850–3900)
ABSOLUTE MONOCYTES: 409 CELLS/UL (ref 200–950)
ABSOLUTE NEUTROPHILS: 2918 CELLS/UL (ref 1500–7800)
ALBUMIN/GLOBULIN RATIO: 1.9 (CALC) (ref 1–2.5)
ALBUMIN: 4.6 G/DL (ref 3.6–5.1)
ALKALINE PHOSPHATASE: 36 U/L (ref 31–125)
ALT: 12 U/L (ref 6–29)
AST: 15 U/L (ref 10–35)
BASOPHILS: 0.5 %
BILIRUBIN, TOTAL: 0.7 MG/DL (ref 0.2–1.2)
BUN: 13 MG/DL (ref 7–25)
CALCIUM: 9.1 MG/DL (ref 8.6–10.2)
CARBON DIOXIDE: 26 MMOL/L (ref 20–32)
CHLORIDE: 109 MMOL/L (ref 98–110)
CHOL/HDLC RATIO: 2.8 (CALC)
CHOLESTEROL, TOTAL: 174 MG/DL
CREATININE: 0.67 MG/DL (ref 0.5–0.99)
EGFR: 109 ML/MIN/1.73M2
EOSINOPHILS: 0.7 %
FERRITIN: 22 NG/ML (ref 16–232)
GLOBULIN: 2.4 G/DL (CALC) (ref 1.9–3.7)
GLUCOSE: 103 MG/DL (ref 65–99)
HDL CHOLESTEROL: 62 MG/DL
HEMATOCRIT: 42.1 % (ref 35–45)
HEMOGLOBIN: 13.7 G/DL (ref 11.7–15.5)
IRON BINDING CAPACITY: 331 MCG/DL (CALC) (ref 250–450)
IRON, TOTAL: 110 MCG/DL (ref 40–190)
LDL-CHOLESTEROL: 102 MG/DL (CALC)
LYMPHOCYTES: 39.4 %
MCH: 30.6 PG (ref 27–33)
MCHC: 32.5 G/DL (ref 32–36)
MCV: 94.2 FL (ref 80–100)
MONOCYTES: 7.3 %
MPV: 10.2 FL (ref 7.5–12.5)
NEUTROPHILS: 52.1 %
NON-HDL CHOLESTEROL: 112 MG/DL (CALC)
PLATELET COUNT: 255 THOUSAND/UL (ref 140–400)
POTASSIUM: 4.2 MMOL/L (ref 3.5–5.3)
PROTEIN, TOTAL: 7 G/DL (ref 6.1–8.1)
RDW: 12.4 % (ref 11–15)
RED BLOOD CELL COUNT: 4.47 MILLION/UL (ref 3.8–5.1)
SODIUM: 141 MMOL/L (ref 135–146)
TRIGLYCERIDES: 35 MG/DL
TSH: 0.93 MIU/L
WHITE BLOOD CELL COUNT: 5.6 THOUSAND/UL (ref 3.8–10.8)

## 2025-03-12 ENCOUNTER — HOSPITAL ENCOUNTER (OUTPATIENT)
Dept: MAMMOGRAPHY | Facility: HOSPITAL | Age: 45
Discharge: HOME | End: 2025-03-12
Attending: STUDENT IN AN ORGANIZED HEALTH CARE EDUCATION/TRAINING PROGRAM
Payer: COMMERCIAL

## 2025-03-12 DIAGNOSIS — R92.1 BREAST CALCIFICATIONS ON MAMMOGRAM: ICD-10-CM

## 2025-03-12 DIAGNOSIS — R92.343 EXTREMELY DENSE TISSUE OF BOTH BREASTS ON MAMMOGRAPHY: ICD-10-CM

## 2025-03-12 PROCEDURE — 77066 DX MAMMO INCL CAD BI: CPT | Performed by: STUDENT IN AN ORGANIZED HEALTH CARE EDUCATION/TRAINING PROGRAM

## 2025-03-12 PROCEDURE — 77062 BREAST TOMOSYNTHESIS BI: CPT | Performed by: STUDENT IN AN ORGANIZED HEALTH CARE EDUCATION/TRAINING PROGRAM

## 2025-03-14 DIAGNOSIS — R92.343 EXTREMELY DENSE TISSUE OF BOTH BREASTS ON MAMMOGRAPHY: Primary | ICD-10-CM
